# Patient Record
Sex: FEMALE | Race: WHITE | Employment: FULL TIME | URBAN - METROPOLITAN AREA
[De-identification: names, ages, dates, MRNs, and addresses within clinical notes are randomized per-mention and may not be internally consistent; named-entity substitution may affect disease eponyms.]

---

## 2017-03-07 RX ORDER — BUPROPION HYDROCHLORIDE 150 MG/1
TABLET, EXTENDED RELEASE ORAL
Qty: 90 TAB | Refills: 1 | Status: SHIPPED | OUTPATIENT
Start: 2017-03-07 | End: 2017-08-20 | Stop reason: SDUPTHER

## 2017-05-31 ENCOUNTER — OFFICE VISIT (OUTPATIENT)
Dept: FAMILY MEDICINE CLINIC | Age: 57
End: 2017-05-31

## 2017-05-31 VITALS
HEIGHT: 67 IN | TEMPERATURE: 98.5 F | RESPIRATION RATE: 12 BRPM | WEIGHT: 142 LBS | DIASTOLIC BLOOD PRESSURE: 80 MMHG | SYSTOLIC BLOOD PRESSURE: 110 MMHG | OXYGEN SATURATION: 97 % | HEART RATE: 74 BPM | BODY MASS INDEX: 22.29 KG/M2

## 2017-05-31 DIAGNOSIS — M67.442 GANGLION CYST OF FINGER OF LEFT HAND: Primary | ICD-10-CM

## 2017-05-31 NOTE — PROGRESS NOTES
HISTORY OF PRESENT ILLNESS  Lew Blancas is a 60 y.o. female. Pt presents with a cyst on her left thumb that is painful. That is her dominant hand. She has one on her right thumb as well but that one does not bother her. Cyst   The history is provided by the patient. This is a chronic problem. The current episode started more than 1 week ago. The problem has been gradually worsening. She has tried nothing for the symptoms. Review of Systems   Constitutional: Negative. Skin:        Cyst on left thumb       Physical Exam   Constitutional: She appears well-developed and well-nourished. Skin: Skin is warm. left thumb palmar surface there is a mobile tender cystic structure about the size of a large pea. There is no surrounding erythema. ASSESSMENT and PLAN    ICD-10-CM ICD-9-CM    1. Ganglion cyst of finger of left hand M67.442 727.43 REFERRAL TO GENERAL SURGERY     Plan: 1% lido with out epi was injected with out difficulty but no exudate was expelled. Site was cleaned and dressed. Signs of infection were reviewed. Pt is to call with any concerns.    Pt ref to General Surgery for further eval.

## 2017-05-31 NOTE — PROGRESS NOTES
1. Have you been to the ER, urgent care clinic since your last visit? Hospitalized since your last visit? Yes When: IN/OUT 01/2017    2. Have you seen or consulted any other health care providers outside of the [address] [place] [address] since your last visit? Include any pap smears or colon screening.  No

## 2017-05-31 NOTE — MR AVS SNAPSHOT
Visit Information Date & Time Provider Department Dept. Phone Encounter #  
 5/31/2017  3:00 PM Josie Rubio  [place] [address] [phone] Follow-up Instructions Return if symptoms worsen or fail to improve. Upcoming Health Maintenance Date Due DTaP/Tdap/Td series (2 - Td) 6/25/2017 INFLUENZA AGE 9 TO ADULT 8/1/2017 BREAST CANCER SCRN MAMMOGRAM 1/8/2018 PAP AKA CERVICAL CYTOLOGY 10/6/2018 COLONOSCOPY 5/13/2025 Allergies as of 5/31/2017  Review Complete On: 5/31/2017 By: Josie Rubio NP Severity Noted Reaction Type Reactions Bactrim [Sulfamethoxazole-trimethoprim]  06/29/2010    Rash Effexor [Venlafaxine]  06/29/2010   Side Effect Other (comments) Effect mitral valve prolapse Paxil [Paroxetine Hcl]  06/29/2010   Side Effect Other (comments)  
  medication would effect patients  mitral valve prolapse Penicillins  09/09/2013    Itching Sulfa (Sulfonamide Antibiotics)  06/29/2010    Rash  
 Zoloft [Sertraline]  06/29/2010   Side Effect Other (comments) Effect mitral valve prolapse Current Immunizations  Reviewed on 11/5/2013 Name Date Influenza Vaccine 10/7/2013 Influenza Vaccine (Quad) PF 11/10/2015, 10/7/2014 Pneumococcal Conjugate (PCV-13) 11/5/2013 Tdap 6/25/2007 Not reviewed this visit You Were Diagnosed With   
  
 Codes Comments Ganglion cyst of finger of left hand    -  Primary ICD-10-CM: P31.455 ICD-9-CM: 727.43 Vitals BP Pulse Temp Resp Height(growth percentile) Weight(growth percentile) 110/80 (BP 1 Location: Left arm, BP Patient Position: Sitting) 74 98.5 °F (36.9 °C) (Oral) 12 5' 7\" (1.702 m) 142 lb (64.4 kg) LMP SpO2 BMI OB Status Smoking Status 06/01/1992 97% 22.24 kg/m2 Hysterectomy Never Smoker BMI and BSA Data Body Mass Index Body Surface Area  
 22.24 kg/m 2 1.74 m 2 Preferred Pharmacy Pharmacy Name Phone [facility] [address] [phone] Your Updated Medication List  
  
   
This list is accurate as of: 5/31/17  5:32 PM.  Always use your most recent med list. amLODIPine 5 mg tablet Commonly known as:  Bonifacioul Marquita TAKE 1 TABLET DAILY  
  
 aspirin delayed-release 81 mg tablet Take 81 mg by mouth daily. budesonide-formoterol 80-4.5 mcg/actuation Hfaa inhaler Commonly known as:  SYMBICORT Take 2 Puffs by inhalation two (2) times a day. buPROPion  mg SR tablet Commonly known as:  WELLBUTRIN SR  
TAKE 1 TABLET DAILY CENTRUM SILVER ULTRA WOMEN'S PO Take  by mouth. fluticasone 50 mcg/actuation nasal spray Commonly known as:  Marcella Ronnie 2 Sprays by Both Nostrils route daily as needed for Rhinitis. Administer to right and left nostril.  
  
 lansoprazole 30 mg capsule Commonly known as:  PREVACID Take 30 mg by mouth daily (before breakfast). PLAQUENIL 200 mg tablet Generic drug:  hydroxychloroquine Take 200 mg by mouth two (2) times a day. PROAIR HFA 90 mcg/actuation inhaler Generic drug:  albuterol INHALE 2 PUFFS EVERY 4 HOURS AS NEEDED FOR WHEEZING We Performed the Following REFERRAL TO GENERAL SURGERY [REF27 Custom] Comments:  
 Please evaluate patient for cyst on left themb. Follow-up Instructions Return if symptoms worsen or fail to improve. Referral Information Referral ID Referred By Referred To  
  
 [id number] [facility] Not Available Visits Status Start Date End Date 1 New Request 5/31/17 5/31/18 If your referral has a status of pending review or denied, additional information will be sent to support the outcome of this decision. Introducing [facility] & HEALTH SERVICES! Dear Reuben Carrion: Thank you for requesting a [employer] account. Our records indicate that you already have an active [employer] account.   You can access your account anytime at [email or web address] Did you know that you can access your hospital and ER discharge instructions at any time in Nanostellar? You can also review all of your test results from your hospital stay or ER visit. Additional Information If you have questions, please visit the Frequently Asked Questions section of the [employer] website at [email or web address] Remember, Nanostellar is NOT to be used for urgent needs. For medical emergencies, dial 911. Now available from your iPhone and Android! Please provide this summary of care documentation to your next provider. Your primary care clinician is listed as [address]. If you have any questions after today's visit, please call [phone].

## 2017-06-21 ENCOUNTER — OFFICE VISIT (OUTPATIENT)
Dept: ORTHOPEDIC SURGERY | Age: 57
End: 2017-06-21

## 2017-06-21 VITALS
HEART RATE: 74 BPM | WEIGHT: 144 LBS | SYSTOLIC BLOOD PRESSURE: 99 MMHG | DIASTOLIC BLOOD PRESSURE: 62 MMHG | BODY MASS INDEX: 22.55 KG/M2 | TEMPERATURE: 98 F

## 2017-06-21 DIAGNOSIS — G89.29 CHRONIC RIGHT SHOULDER PAIN: ICD-10-CM

## 2017-06-21 DIAGNOSIS — M25.511 CHRONIC RIGHT SHOULDER PAIN: ICD-10-CM

## 2017-06-21 DIAGNOSIS — M79.645 THUMB PAIN, LEFT: ICD-10-CM

## 2017-06-21 DIAGNOSIS — M79.89 SOFT TISSUE MASS: Primary | ICD-10-CM

## 2017-06-21 NOTE — PROGRESS NOTES
Felicia Hdez  1960   Chief Complaint   Patient presents with    Hand Pain     Left    Shoulder Pain     Right        HISTORY OF PRESENT ILLNESS  Felicia Hdez is a 60 y.o. female who presents today for evaluation of left thumb and right shoulder pain. She rates her pain 0/10 today. Patient was referred by Ilda Gibson NP for further evaluation. She is complaining of a cyst in the left thumb and feels there may be another in the right shoulder. She states her PCP tried to removed the cyst on her left thumb. She states the cyst intermittently swells. The cyst on her left thumb has been present for about 6 months. She is LH dominant. She describes the pain in the right shoulder as dull and aching. She states she has been working out at [employer] and was attributing her shoulder pain to this. Allergies   Allergen Reactions    Bactrim [Sulfamethoxazole-Trimethoprim] Rash    Effexor [Venlafaxine] Other (comments)     Effect mitral valve prolapse    Paxil [Paroxetine Hcl] Other (comments)      medication would effect patients  mitral valve prolapse    Penicillins Itching    Sulfa (Sulfonamide Antibiotics) Rash    Zoloft [Sertraline] Other (comments)     Effect mitral valve prolapse        Past Medical History:   Diagnosis Date    Abnormal pap     ASCUS +HPV    Asthma 6/6/2012    Cancer (HCC)     skin, basal cell    Contact dermatitis and other eczema, due to unspecified cause     basal cell cancer    ULISES (generalised anxiety disorder)     GERD (gastroesophageal reflux disease) 6/29/2010    Hypercholesterolemia     Step I diet    MVP (mitral valve prolapse)     Pneumonia     4 times    Sciatica 6/6/2012      Social History     Social History    Marital status:      Spouse name: N/A    Number of children: N/A    Years of education: N/A     Occupational History    Not on file.      Social History Main Topics    Smoking status: Never Smoker    Smokeless tobacco: Never Used  Alcohol use 0.0 oz/week     0 Standard drinks or equivalent per week      Comment: occasional    Drug use: No    Sexual activity: Yes     Partners: Male     Other Topics Concern    Not on file     Social History Narrative      Past Surgical History:   Procedure Laterality Date    HX COLONOSCOPY  5/13/2015    follow up 5 years    HX GYN      hysterectomy      Family History   Problem Relation Age of Onset    Diabetes Mother     Heart Disease Mother     Cancer Father     Cancer Paternal Grandfather       Current Outpatient Prescriptions   Medication Sig    buPROPion SR (WELLBUTRIN SR) 150 mg SR tablet TAKE 1 TABLET DAILY    amLODIPine (NORVASC) 5 mg tablet TAKE 1 TABLET DAILY    MULTIVITS W-FE,OTHER MIN/LUT (CENTRUM SILVER ULTRA WOMEN'S PO) Take  by mouth.  fluticasone (FLONASE) 50 mcg/actuation nasal spray 2 Sprays by Both Nostrils route daily as needed for Rhinitis. Administer to right and left nostril.  PROAIR HFA 90 mcg/actuation inhaler INHALE 2 PUFFS EVERY 4 HOURS AS NEEDED FOR WHEEZING    lansoprazole (PREVACID) 30 mg capsule Take 30 mg by mouth daily (before breakfast).  aspirin delayed-release 81 mg tablet Take 81 mg by mouth daily.  budesonide-formoterol (SYMBICORT) 80-4.5 mcg/actuation HFAA inhaler Take 2 Puffs by inhalation two (2) times a day. (Patient taking differently: Take 2 Puffs by inhalation two (2) times a day. Patient is taking 1 puff bid)    hydroxychloroquine (PLAQUENIL) 200 mg tablet Take 200 mg by mouth two (2) times a day. No current facility-administered medications for this visit. REVIEW OF SYSTEM   Patient denies: Weight loss, Fever/Chills, HA, Visual changes, Fatigue, Chest pain, SOB, Abdominal pain, N/V/D/C, Blood in stool or urine, Edema. Pertinent positive as above in HPI.  All others were negative    PHYSICAL EXAM:   Visit Vitals    BP 99/62 (BP 1 Location: Left arm, BP Patient Position: Sitting)    Pulse 74    Temp 98 °F (36.7 °C) (Oral)  Wt 144 lb (65.3 kg)    LMP 06/01/1992    BMI 22.55 kg/m2     The patient is a well-developed, well-nourished female   in no acute distress. The patient is alert and oriented times three. The patient is alert and oriented times three. Mood and affect are normal.  LYMPHATIC: lymph nodes are not enlarged and are within normal limits  SKIN: normal in color and non tender to palpation. There are no bruises or abrasions noted. NEUROLOGICAL: Motor sensory exam is within normal limits. Reflexes are equal bilaterally. There is normal sensation to pinprick and light touch  MUSCULOSKELETAL:  Examination Left Hand   Skin Intact   Deformity -   Swelling -   Tenderness -   Tenderness A1 Pulley -   Finger flexion Full   Finger extension Full   Thenar Eminence Atrophy -   Sensation Normal   Capillary refill -   Heberden's nodes -   Dupuytren's -     Examination Left Wrist   Skin Intact   Tenderness -   Flexion 60   Extension 60   Deformity -   Effusion -   Tinnel's sign -   Phalen's test -   Finklestein maneuver -   Pain with thumb abduction -   Solid mass volar aspect fo the thumb  Examination Right shoulder   Skin Intact   AC joint tenderness +   Biceps tenderness -   Forward flexion/Elevation    Active abduction    Glenohumeral abduction 90   External rotation ROM 90   Internal rotation ROM 70   Apprehension -   Williamss Relocation -   Jerk -   Load and Shift -   Obriens -   Speeds -   Impingement sign +   Supraspinatus/Empty Can -, 5/5   External Rotation Strength -, 5/5   Lift Off/Belly Press -, 5/5   Neurovascular Intact   Prominence over the distal clavicle       PROCEDURE:     IMAGING:   XR of the left hand dated 6/21/17 was reviewed and read: Soft tissue calcification of the distal thumb on the volar side    XR of the right shoulder dated 6/21/17 was reviewed and read: Prominence of the distal clavical      IMPRESSION:      ICD-10-CM ICD-9-CM    1. Soft tissue mass M79.9 729.90    2.  Thumb pain, left M79.645 729.5 AMB POC XRAY, FINGER(S), 2+ VIEWS   3. Chronic right shoulder pain M25.511 719.41 AMB POC XRAY, SHOULDER; COMPLETE, 2+    G89.29 338.29         PLAN: I discussed with the patient that the cyst will be a nuisance more than anything. There is no necessity for surgical intervention of the cyst on the left thumb. She would like to think about proceeding with removal of the cyst.  Office note will be sent to the referring provider. She was given Florinda's card. She does not feel the shoulder is painful enough to proceed with an injection at this time. I will see her back as needed. Follow-up Disposition: Not on File    Scribed by Colby Roberts [facility]) as dictated by Bushra Osman MD    I, Dr. Bushra Osman, confirm that all documentation is accurate.     Bushra Osman M.D.   [facility]

## 2017-06-21 NOTE — PATIENT INSTRUCTIONS
Hand Pain: Care Instructions  Your Care Instructions  Common causes of hand pain are overuse and injuries, such as might happen during sports or home repair projects. Everyday wear and tear, especially as you get older, also can cause hand pain. Most minor hand injuries will heal on their own, and home treatment is usually all you need to do. If you have sudden and severe pain, you may need tests and treatment. Follow-up care is a key part of your treatment and safety. Be sure to make and go to all appointments, and call your doctor if you are having problems. Its also a good idea to know your test results and keep a list of the medicines you take. How can you care for yourself at home? · Take pain medicines exactly as directed. ¨ If the doctor gave you a prescription medicine for pain, take it as prescribed. ¨ If you are not taking a prescription pain medicine, ask your doctor if you can take an over-the-counter medicine. · Rest and protect your hand. Take a break from any activity that may cause pain. · Put ice or a cold pack on your hand for 10 to 20 minutes at a time. Put a thin cloth between the ice and your skin. · Prop up the sore hand on a pillow when you ice it or anytime you sit or lie down during the next 3 days. Try to keep it above the level of your heart. This will help reduce swelling. · If your doctor recommends a sling, splint, or elastic bandage to support your hand, wear it as directed. When should you call for help? Call 911 anytime you think you may need emergency care. For example, call if:  · Your hand turns cool or pale or changes color. Call your doctor now or seek immediate medical care if:  · You cannot move your hand. · Your hand pops, moves out of its normal position, and then returns to its normal position. · You have signs of infection, such as:  ¨ Increased pain, swelling, warmth, or redness. ¨ Red streaks leading from the sore area.   ¨ Pus draining from a place on your hand. ¨ A fever. · Your hand feels numb or tingly. Watch closely for changes in your health, and be sure to contact your doctor if:  · Your hand feels unstable when you try to use it. · You do not get better as expected. · You have any new symptoms, such as swelling. · Bruises from an injury to your hand last longer than 2 weeks. Where can you learn more? Go to http://kenneth-ramon.info/. Enter R273 in the search box to learn more about \"Hand Pain: Care Instructions. \"  Current as of: March 20, 2017  Content Version: 11.3  © 0742-2942 [employer]. Care instructions adapted under license by "[employer]" (which disclaims liability or warranty for this information). If you have questions about a medical condition or this instruction, always ask your healthcare professional. [address] any warranty or liability for your use of this information.

## 2017-08-21 RX ORDER — BUPROPION HYDROCHLORIDE 150 MG/1
TABLET, EXTENDED RELEASE ORAL
Qty: 90 TAB | Refills: 1 | Status: SHIPPED | OUTPATIENT
Start: 2017-08-21 | End: 2018-02-19 | Stop reason: SDUPTHER

## 2017-11-01 ENCOUNTER — OFFICE VISIT (OUTPATIENT)
Dept: PULMONOLOGY | Age: 57
End: 2017-11-01

## 2017-11-01 VITALS
OXYGEN SATURATION: 98 % | DIASTOLIC BLOOD PRESSURE: 60 MMHG | TEMPERATURE: 98.8 F | WEIGHT: 147 LBS | SYSTOLIC BLOOD PRESSURE: 112 MMHG | HEIGHT: 67 IN | HEART RATE: 78 BPM | BODY MASS INDEX: 23.07 KG/M2 | RESPIRATION RATE: 12 BRPM

## 2017-11-01 DIAGNOSIS — M34.1 CREST SYNDROME (HCC): ICD-10-CM

## 2017-11-01 DIAGNOSIS — J47.9 BRONCHIECTASIS WITHOUT COMPLICATION (HCC): ICD-10-CM

## 2017-11-01 DIAGNOSIS — J45.30 MILD PERSISTENT ASTHMA WITHOUT COMPLICATION: Primary | ICD-10-CM

## 2017-11-01 RX ORDER — FLUTICASONE FUROATE AND VILANTEROL 100; 25 UG/1; UG/1
1 POWDER RESPIRATORY (INHALATION) DAILY
Qty: 1 INHALER | Refills: 5 | Status: SHIPPED | OUTPATIENT
Start: 2017-11-01 | End: 2018-03-23

## 2017-11-01 RX ORDER — LORATADINE 10 MG/1
10 TABLET ORAL AS NEEDED
COMMUNITY

## 2017-11-01 RX ORDER — FLUTICASONE FUROATE AND VILANTEROL 100; 25 UG/1; UG/1
1 POWDER RESPIRATORY (INHALATION) DAILY
Qty: 1 INHALER | Refills: 0 | Status: SHIPPED | COMMUNITY
Start: 2017-11-01 | End: 2017-11-14 | Stop reason: SDUPTHER

## 2017-11-01 NOTE — MR AVS SNAPSHOT
Visit Information Date & Time Provider Department Dept. Phone Encounter #  
 11/1/2017  3:00 PM MD Rita [facility] [id number] Follow-up Instructions Return in about 3 months (around 2/1/2018). Your Appointments 11/14/2017  8:00 AM  
Office Visit with Carmelo Feng NP [facility] (ELLIE Seals) Appt Note: FU AND MED REFILLS; PT R/S FROM 10/23/2017; Copay: OV $20 / SP $40.; pt r/s 10/31/17 rb  
 [address], [place] [address] [address]  
[phone]  
  
   
 [address] Upcoming Health Maintenance Date Due DTaP/Tdap/Td series (2 - Td) 6/25/2017 INFLUENZA AGE 9 TO ADULT 8/1/2017 BREAST CANCER SCRN MAMMOGRAM 1/8/2018 PAP AKA CERVICAL CYTOLOGY 10/6/2018 COLONOSCOPY 5/13/2025 Allergies as of 11/1/2017  Review Complete On: 11/1/2017 By: Nishant Gar MD  
  
 Severity Noted Reaction Type Reactions Bactrim [Sulfamethoxazole-trimethoprim]  06/29/2010    Rash Effexor [Venlafaxine]  06/29/2010   Side Effect Other (comments) Effect mitral valve prolapse Paxil [Paroxetine Hcl]  06/29/2010   Side Effect Other (comments)  
  medication would effect patients  mitral valve prolapse Penicillins  09/09/2013    Itching Sulfa (Sulfonamide Antibiotics)  06/29/2010    Rash  
 Zoloft [Sertraline]  06/29/2010   Side Effect Other (comments) Effect mitral valve prolapse Current Immunizations  Reviewed on 11/5/2013 Name Date Influenza Vaccine 10/7/2013 Influenza Vaccine (Quad) PF 11/10/2015, 10/7/2014 Pneumococcal Conjugate (PCV-13) 11/5/2013 Tdap 6/25/2007 Not reviewed this visit You Were Diagnosed With   
  
 Codes Comments Mild persistent asthma without complication    -  Primary ICD-10-CM: J45.30 ICD-9-CM: 493.90 [facility]     VANNESA-19-DZ: J47.9 ICD-9-CM: 494.0   
 CREST syndrome ([facility]     ICD-10-CM: M34.1 ICD-9-CM: 710.1 Vitals BP Pulse Temp Resp Height(growth percentile) Weight(growth percentile) 112/60 (BP 1 Location: Left arm, BP Patient Position: Sitting) 78 98.8 °F (37.1 °C) 12 5' 7\" (1.702 m) 147 lb (66.7 kg) LMP SpO2 BMI OB Status Smoking Status 06/01/1992 98% 23.02 kg/m2 Hysterectomy Never Smoker Vitals History BMI and BSA Data Body Mass Index Body Surface Area 23.02 kg/m 2 1.78 m 2 Preferred Pharmacy Pharmacy Name Phone [id number]- [address] [phone] Your Updated Medication List  
  
   
This list is accurate as of: 11/1/17  3:55 PM.  Always use your most recent med list. amLODIPine 5 mg tablet Commonly known as:  Sony Alstrom TAKE 1 TABLET DAILY  
  
 aspirin delayed-release 81 mg tablet Take 81 mg by mouth daily. buPROPion  mg SR tablet Commonly known as:  WELLBUTRIN SR  
TAKE 1 TABLET DAILY CENTRUM SILVER ULTRA WOMEN'S PO Take  by mouth. fluticasone 50 mcg/actuation nasal spray Commonly known as:  Lindalee Munday 2 Sprays by Both Nostrils route daily as needed for Rhinitis. Administer to right and left nostril. * fluticasone-vilanterol 100-25 mcg/dose inhaler Commonly known as:  BREO ELLIPTA Take 1 Puff by inhalation daily. * fluticasone-vilanterol 100-25 mcg/dose inhaler Commonly known as:  BREO ELLIPTA Take 1 Puff by inhalation daily. lansoprazole 30 mg capsule Commonly known as:  PREVACID Take 30 mg by mouth daily (before breakfast). loratadine 10 mg tablet Commonly known as:  Payam [place] Take 10 mg by mouth as needed. PLAQUENIL 200 mg tablet Generic drug:  hydroxychloroquine Take 200 mg by mouth two (2) times a day. PROAIR HFA 90 mcg/actuation inhaler Generic drug:  albuterol INHALE 2 PUFFS EVERY 4 HOURS AS NEEDED FOR WHEEZING  
  
 * Notice: This list has 2 medication(s) that are the same as other medications prescribed for you. Read the directions carefully, and ask your doctor or other care provider to review them with you. Prescriptions Sent to Pharmacy Refills  
 fluticasone-vilanterol (BREO ELLIPTA) 100-25 mcg/dose inhaler 5 Sig: Take 1 Puff by inhalation daily. Class: Normal  
 Pharmacy: [address] [facility], [address] [facility] #: [phone] Route: Inhalation Follow-up Instructions Return in about 3 months (around 2/1/2018). Introducing [facility]! Dear Nuvia Munguia: Thank you for requesting a GlobalLog[employer] account. Our records indicate that you already have an active GlobalLog[employer] account. You can access your account anytime at [email or web address] Did you know that you can access your hospital and ER discharge instructions at any time in GlobalLogic? You can also review all of your test results from your hospital stay or ER visit. Additional Information If you have questions, please visit the Frequently Asked Questions section of the GlobalLogic website at [email or web address] Remember, GlobalLogic is NOT to be used for urgent needs. For medical emergencies, dial 911. Now available from your iPhone and Android! Please provide this summary of care documentation to your next provider. Your primary care clinician is listed as [address]. If you have any questions after today's visit, please call [phone].

## 2017-11-01 NOTE — PROGRESS NOTES
HISTORY OF PRESENT ILLNESS  Yoana Barrientos is a 59 y.o. female. HPI Comments: Evaluated  for abnormal CT chest that demonstrated isolated bronchiectasis in right upper lobe area. Patient carries history of CREST syndrome for past five years. Before that she carried diagnosis of undifferentiated connective tissue disorder for many years. She also carries diagnosis of chronic bronchitis for past five years. She has been closely followed by Rheumatologist and undergoes cardiac and pulmonary evaluation at regular interval. PFT performed in 2014 demonstrated mild obstructive and restrictive changes with baseline FEV1 around 73% of predicted. CT chest with contrast performed in December 2014 revealed mild bronchiectasis only involving right upper lobe area. She has a chronic cough with baseline yellowish phlegm production. From pulmonary prospective patient reports episodic SOB that usually occurs in extreme weather and or exposure to noxious stimulus such as cigarette smoke. She also reports subjective wheezing. She uses rescue inhaler with rapid improvement in breathing. She has noticed significant increase in pulmonary symptoms after acute bronchitis symptoms. She reports a history pneumonia in 1989. She denies any hospitalization related to respiratory process. As for her asthma, pt was well controlled on Symbicort for years, so she self discontinued this. She resumed use after note of increasing chest tightness and wheezing. This time around she did not perceive any response and has used rescue Albuterol more often. She denies any GERD symptoms.         Review of Systems   Constitutional: Negative for chills, diaphoresis, fever, malaise/fatigue and weight loss. HENT: Negative for congestion, ear discharge, ear pain, hearing loss, nosebleeds, sinus pain, sore throat and tinnitus. Eyes: Negative for blurred vision, double vision, photophobia, pain, discharge and redness.    Respiratory: Positive for cough, sputum production, shortness of breath and wheezing. Negative for hemoptysis and stridor. Cardiovascular: Negative for chest pain, palpitations, orthopnea, claudication, leg swelling and PND. Gastrointestinal: Negative for abdominal pain, blood in stool, constipation, diarrhea, heartburn, melena, nausea and vomiting. Genitourinary: Negative for dysuria, flank pain, frequency, hematuria and urgency. Musculoskeletal: Negative for back pain, falls, joint pain, myalgias and neck pain. Skin: Negative for itching and rash. Neurological: Negative for dizziness, tingling, tremors, sensory change, speech change, focal weakness, seizures, loss of consciousness, weakness and headaches. Endo/Heme/Allergies: Negative for environmental allergies. Does not bruise/bleed easily. Psychiatric/Behavioral: Negative for depression, hallucinations, memory loss, substance abuse and suicidal ideas. The patient is nervous/anxious. Past Medical History:   Diagnosis Date    Abnormal pap     ASCUS +HPV    Asthma 6/6/2012    Cancer ([facility] [address]     skin, basal cell    Contact dermatitis and other eczema, due to unspecified cause     basal cell cancer    ULISES (generalised anxiety disorder)     GERD (gastroesophageal reflux disease) 6/29/2010    Hypercholesterolemia     Step I diet    MVP (mitral valve prolapse)     Pneumonia     4 times    Sciatica 6/6/2012     Past Surgical History:   Procedure Laterality Date    HX COLONOSCOPY  5/13/2015    follow up 5 years    HX GYN      hysterectomy     Current Outpatient Prescriptions on File Prior to Visit   Medication Sig Dispense Refill    buPROPion SR (WELLBUTRIN SR) 150 mg SR tablet TAKE 1 TABLET DAILY 90 Tab 1    amLODIPine (NORVASC) 5 mg tablet TAKE 1 TABLET DAILY 90 Tab 3    MULTIVITS W-FE,OTHER MIN/LUT (CENTRUM SILVER ULTRA WOMEN'S PO) Take  by mouth.       fluticasone (FLONASE) 50 mcg/actuation nasal spray 2 Sprays by Both Nostrils route daily as needed for Rhinitis. Administer to right and left nostril. 3 Bottle 3    PROAIR HFA 90 mcg/actuation inhaler INHALE 2 PUFFS EVERY 4 HOURS AS NEEDED FOR WHEEZING 1 Inhaler 0    lansoprazole (PREVACID) 30 mg capsule Take 30 mg by mouth daily (before breakfast).  hydroxychloroquine (PLAQUENIL) 200 mg tablet Take 200 mg by mouth two (2) times a day.  aspirin delayed-release 81 mg tablet Take 81 mg by mouth daily. No current facility-administered medications on file prior to visit. Allergies   Allergen Reactions    Bactrim [Sulfamethoxazole-Trimethoprim] Rash    Effexor [Venlafaxine] Other (comments)     Effect mitral valve prolapse    Paxil [Paroxetine Hcl] Other (comments)      medication would effect patients  mitral valve prolapse    Penicillins Itching    Sulfa (Sulfonamide Antibiotics) Rash    Zoloft [Sertraline] Other (comments)     Effect mitral valve prolapse     Social History     Social History    Marital status:      Spouse name: N/A    Number of children: N/A    Years of education: N/A     Occupational History    Not on file. Social History Main Topics    Smoking status: Never Smoker    Smokeless tobacco: Never Used    Alcohol use 0.0 oz/week     0 Standard drinks or equivalent per week      Comment: occasional    Drug use: No    Sexual activity: Yes     Partners: Male     Other Topics Concern    Not on file     Social History Narrative     Blood pressure 112/60, pulse 78, temperature 98.8 °F (37.1 °C), resp. rate 12, height 5' 7\" (1.702 m), weight 66.7 kg (147 lb), last menstrual period 06/01/1992, SpO2 98 %. Physical Exam   Constitutional: She is oriented to person, place, and time. She appears well-developed and well-nourished. No distress. HENT:   Head: Normocephalic and atraumatic. Nose: Nose normal.   Mouth/Throat: No oropharyngeal exudate. Eyes: Conjunctivae and EOM are normal. Pupils are equal, round, and reactive to light.  Right eye exhibits no discharge. Left eye exhibits no discharge. Neck: No JVD present. No tracheal deviation present. No thyromegaly present. Cardiovascular: Normal rate, regular rhythm, normal heart sounds and intact distal pulses. Exam reveals no gallop and no friction rub. No murmur heard. Pulmonary/Chest: Effort normal and breath sounds normal. No stridor. No respiratory distress. She has no wheezes. She has no rales. She exhibits no tenderness. Abdominal: Soft. She exhibits no mass. There is no tenderness. Musculoskeletal: She exhibits no edema, tenderness or deformity. Lymphadenopathy:     She has no cervical adenopathy. Neurological: She is alert and oriented to person, place, and time. Skin: Skin is warm and dry. No rash noted. She is not diaphoretic. No erythema. Psychiatric: She has a normal mood and affect. Her behavior is normal. Judgment and thought content normal.     Echo Cardiogram Hgxehply01/14/2016  [address]  Component Name Value Ref Range   EF Echo 55     Result Impression   :   NORMAL LEFT VENTRICULAR SYSTOLIC FUNCTION WITH AN EJECTION FRACTION OF 15%   MILD DIASTOLIC DYSFUNCTION. THICKENED ANTERIOR LEAFLET OF THE MITRAL VALVE WITHOUT PROLAPSE   MILD TO MODERATE REGURGITATION   NORMAL PULMONARY ARTERY PRESSURE   NORMAL RIGHT VENTRICULAR SYSTOLIC FUNCTION. COMPARED WITH PRIOR ECHO DATED 6/24/14, CHANGES INCLUDE AN INCREASE IN MITRAL REGURGITATION   FROM TRACE TO MILD/MODERATE   PREVIOUSLY E' WAS 8.6 CENTIMETERS PER SECOND, THUS DIASTOLIC FUNCTION APPEARS SIMILAR. ALL OTHER FINDINGS REMAIN SIMILAR.      Clinical Indications: Palpitations; Mitral valve prolapse; [facility]   ICD Codes: R00.2; I34.1; M34.1 Technical Quality: Adequate     MEASUREMENTS:   2D ECHO    RV Diameter                       2.5 YL                0.0-1.0   LV Diastolic Diameter Base LX     3.9 OL                5.0-1.0   LV Systolic Diameter Base BY      4.7 cm                2.3-4.0   LV Ejection Fraction 2D Teich     7.35   IVS Diastolic Thickness           0.93 cm               0.6-1.1 cm   LVPW Diastolic VEYBALIQY          6.49 cm               3.5-0.4 cm   LA Systolic Diameter LX           3.1 cm                2.1-3.7 cm   Aortic Root Diameter              3.5 cm                2.0-3.5 cm   LA Area 4C View                   16.8 cm²   LA Area 2C View                   16.4 cm²   LA Length 4C                      5.1 cm   LA Length 2C                      4.7 cm   LA Volume                         49.4 cm³     DOPPLER    Mitral E Point Velocity           75.6 cm/s   Mitral  A Point Velocity          82.2 cm/s   Mitral A Duration                 149 ms   Mitral E to A Ratio               0.92                  1.0 to 1.5   Mitral E to LV E' Lateral Ratio   9.3   MV Deceleration Time              277 ms                160-240 ms   Isovolumic Relaxation Time        88 ms                 70-90 ms   Pulm Vein Atrial Reversal Veloci  60.4 cm/s             <35 cm/s   Pulm Vein Atrial Duration         145 ms   MR Peak Velocity                  562 cm/s   MR Orifice PISA                   0.092 cm²   Mitral Regurgitant Volume         17.3 cm³   E Prime Velocity                  8.2 cm/s   RA Pressure (Entered Value)       3 mmHg   TR Peak Velocity                  2.1 m/s   TR Peak [id number]                  26 mmHg   RV Systolic TYOPWRCL              11 mmHg       FINDINGS:     Left Ventricle: The left ventricle was normal in size   Normal wall thickness. Left Ventricle The left ventricular systolic function is normal.   Function: Mild diastolic dysfunction. No segmental wall motion abnormalities. LVEF: 55 %       Left Atrium: The left atrium was normal in size. The LA volume index was 28mL/m2   Right Ventricle: The right ventricle was normal in size and function   Tricuspid Annular Plane Systolic Excursion (TAPSE) is 2.3cm. Tricuspid Annular Plane Systolic Velocity (TAPSV) is 14cm/s.    Right Atrium: The right atrium was normal in size. Mitral Valve: Elongated, slightly thickened anterior mitral valve leaflet without prolapse. Mild to moderate mitral regurgitation. Aortic Valve: The aortic valve leaflets were normal without regurgitation or stenosis. Tricuspid Valve: The tricuspid valve was structurally normal with mild regurgitation. The estimated pulmonary artery pressure was 20mmHg. Pulmonic Valve: The pulmonic valve was structurally normal.   Aorta: The aortic root diameter was at the upper limits of normal = 3.5cm   Proximal ascending aorta = 3.5cm   Pericardium: The pericardium was normal.   Masses / Shunts: No masses, shunts or thrombi seen. Kathi Hagen MD   (Electronically Signed)   Final Date: 2016 16:53   Result Narrative        ECHOCARDIOGRAPHIC REPORT     Exam Date: 2016 15:05 Gender: F Referring Physician: Shiv Matthew   Name: Johanna Rucker :  Sonographer: Analisa Vogt   CPI: [id number] BP: 122 / 62 Ht: 66 Wt: 143 BSA: 1.74     Type of Exam: ECHO CARDIOGRAM COMPLETE   Procedure: 2-D echocardiogram,Color flow analysis, Spectral Doppler analysis   ________     PFT's: combined obstruction and restriction, see attached. ASSESSMENT and PLAN  Encounter Diagnoses   Name Primary?  Mild persistent asthma without complication Yes    Bronchiectasis without complication ([address]     CREST syndrome ([address]      Pt reminded on SSx of Bronchiectasis exacerbation and instructed to call for possible antibiotic therapy. As pt response to Symbicort has waned, will try Breo, see orders. Pt instructed on inhaler technique and sample given. Continue rescue Albuterol as needed. Reviewed outside Echo and PFT's with pt. Possibility of pulmonary HTN associated with CTD less likely given Echo results but will keep in differential if SOB persists. In that case pt may require R heart catheterization. RTC 2 months.

## 2017-11-14 ENCOUNTER — HOSPITAL ENCOUNTER (OUTPATIENT)
Dept: LAB | Age: 57
Discharge: HOME | End: 2017-11-14
Payer: COMMERCIAL

## 2017-11-14 ENCOUNTER — OFFICE VISIT (OUTPATIENT)
Dept: FAMILY MEDICINE CLINIC | Age: 57
End: 2017-11-14

## 2017-11-14 VITALS
SYSTOLIC BLOOD PRESSURE: 115 MMHG | OXYGEN SATURATION: 98 % | HEIGHT: 67 IN | DIASTOLIC BLOOD PRESSURE: 66 MMHG | HEART RATE: 79 BPM | BODY MASS INDEX: 22.91 KG/M2 | RESPIRATION RATE: 18 BRPM | TEMPERATURE: 98.1 F | WEIGHT: 146 LBS

## 2017-11-14 DIAGNOSIS — J18.9 RECURRENT PNEUMONIA: ICD-10-CM

## 2017-11-14 DIAGNOSIS — I10 ESSENTIAL HYPERTENSION: ICD-10-CM

## 2017-11-14 DIAGNOSIS — E78.00 PURE HYPERCHOLESTEROLEMIA: ICD-10-CM

## 2017-11-14 DIAGNOSIS — Z23 ENCOUNTER FOR IMMUNIZATION: ICD-10-CM

## 2017-11-14 DIAGNOSIS — I10 ESSENTIAL HYPERTENSION: Primary | ICD-10-CM

## 2017-11-14 LAB
ALBUMIN SERPL-MCNC: 4 G/DL (ref 3.4–5)
ALBUMIN/GLOB SERPL: 1 {RATIO} (ref 0.8–1.7)
ALP SERPL-CCNC: 48 U/L (ref 45–117)
ALT SERPL-CCNC: 87 U/L (ref 13–56)
ANION GAP SERPL CALC-SCNC: 6 MMOL/L (ref 3–18)
AST SERPL-CCNC: 40 U/L (ref 15–37)
BILIRUB SERPL-MCNC: 0.5 MG/DL (ref 0.2–1)
BUN SERPL-MCNC: 17 MG/DL (ref 7–18)
BUN/CREAT SERPL: 23 (ref 12–20)
CALCIUM SERPL-MCNC: 9.4 MG/DL (ref 8.5–10.1)
CHLORIDE SERPL-SCNC: 102 MMOL/L (ref 100–108)
CHOLEST SERPL-MCNC: 234 MG/DL
CO2 SERPL-SCNC: 33 MMOL/L (ref 21–32)
CREAT SERPL-MCNC: 0.73 MG/DL (ref 0.6–1.3)
ERYTHROCYTE [DISTWIDTH] IN BLOOD BY AUTOMATED COUNT: 13.1 % (ref 11.6–14.5)
GLOBULIN SER CALC-MCNC: 4 G/DL (ref 2–4)
GLUCOSE SERPL-MCNC: 86 MG/DL (ref 74–99)
HCT VFR BLD AUTO: 44.8 % (ref 35–45)
HDLC SERPL-MCNC: 64 MG/DL (ref 40–60)
HDLC SERPL: 3.7 {RATIO} (ref 0–5)
HGB BLD-MCNC: 14.4 G/DL (ref 12–16)
LDLC SERPL CALC-MCNC: 145.8 MG/DL (ref 0–100)
LIPID PROFILE,FLP: ABNORMAL
MCH RBC QN AUTO: 31.3 PG (ref 24–34)
MCHC RBC AUTO-ENTMCNC: 32.1 G/DL (ref 31–37)
MCV RBC AUTO: 97.4 FL (ref 74–97)
PLATELET # BLD AUTO: 240 K/UL (ref 135–420)
PMV BLD AUTO: 12.2 FL (ref 9.2–11.8)
POTASSIUM SERPL-SCNC: 4.2 MMOL/L (ref 3.5–5.5)
PROT SERPL-MCNC: 8 G/DL (ref 6.4–8.2)
RBC # BLD AUTO: 4.6 M/UL (ref 4.2–5.3)
SODIUM SERPL-SCNC: 141 MMOL/L (ref 136–145)
TRIGL SERPL-MCNC: 121 MG/DL (ref ?–150)
VLDLC SERPL CALC-MCNC: 24.2 MG/DL
WBC # BLD AUTO: 4.1 K/UL (ref 4.6–13.2)

## 2017-11-14 PROCEDURE — 85027 COMPLETE CBC AUTOMATED: CPT | Performed by: NURSE PRACTITIONER

## 2017-11-14 PROCEDURE — 80053 COMPREHEN METABOLIC PANEL: CPT | Performed by: NURSE PRACTITIONER

## 2017-11-14 PROCEDURE — 36415 COLL VENOUS BLD VENIPUNCTURE: CPT | Performed by: NURSE PRACTITIONER

## 2017-11-14 PROCEDURE — 80061 LIPID PANEL: CPT | Performed by: NURSE PRACTITIONER

## 2017-11-14 NOTE — PROGRESS NOTES
1. Have you been to the ER, urgent care clinic since your last visit? Hospitalized since your last visit? January 2017 for pneumonia In/Out [facility]    2. Have you seen or consulted any other health care providers outside of the [address] [place] [address] since your last visit? Include any pap smears or colon screening. No     Subjective:   Pablito Malcolm is a 59 y.o. female with hypertension. Current Outpatient Prescriptions   Medication Sig Dispense Refill    loratadine (CLARITIN) 10 mg tablet Take 10 mg by mouth as needed.  fluticasone-vilanterol (BREO ELLIPTA) 100-25 mcg/dose inhaler Take 1 Puff by inhalation daily. 1 Inhaler 5    buPROPion SR (WELLBUTRIN SR) 150 mg SR tablet TAKE 1 TABLET DAILY 90 Tab 1    amLODIPine (NORVASC) 5 mg tablet TAKE 1 TABLET DAILY 90 Tab 3    MULTIVITS W-FE,OTHER MIN/LUT (CENTRUM SILVER ULTRA WOMEN'S PO) Take  by mouth.  fluticasone (FLONASE) 50 mcg/actuation nasal spray 2 Sprays by Both Nostrils route daily as needed for Rhinitis. Administer to right and left nostril. 3 Bottle 3    PROAIR HFA 90 mcg/actuation inhaler INHALE 2 PUFFS EVERY 4 HOURS AS NEEDED FOR WHEEZING 1 Inhaler 0    lansoprazole (PREVACID) 30 mg capsule Take 30 mg by mouth daily (before breakfast).  hydroxychloroquine (PLAQUENIL) 200 mg tablet Take 200 mg by mouth two (2) times a day.  aspirin delayed-release 81 mg tablet Take 81 mg by mouth daily. Hypertension ROS: taking medications as instructed, no medication side effects noted, no TIA's, no chest pain on exertion, no dyspnea on exertion, no swelling of ankles. New concerns: immunization update. Patient reports that she had pneumonia in January 2017 and it took five antibiotics to resolve. She also reports that she has seen Dr. Valery Rogers recently and her Symbicort was changes to [facility] and she seems to be doing better.     She recalls that her only symptoms for pneumonia was a fever for four days but no other symptoms and she has had the Prevnar 13 vaccine in November 2013 and did not think it was pneumonia. No records on chart and signed release of information for records today  Fasting today   No request for refills . Objective:   Awake and alert in no acute distress  Neck supple without lymphadenopathy, no carotid artery bruits auscultated bilaterally. No thyromegaly  Lungs clear throughout  S1 S2 RRR without ectopy or murmur auscultated. Extremities: no clubbing, cyanosis, peripheral edema      Visit Vitals    /66 (BP 1 Location: Left arm, BP Patient Position: Sitting)    Pulse 79    Temp 98.1 °F (36.7 °C) (Oral)    Resp 18    Ht 5' 7\" (1.702 m)    Wt 146 lb (66.2 kg)    LMP 06/01/1992    SpO2 98%    BMI 22.87 kg/m2      Diagnoses and all orders for this visit:    1. Essential hypertension  -     LIPID PANEL; Future  -     METABOLIC PANEL, COMPREHENSIVE; Future  -     CBC W/O DIFF; Future    2. Recurrent pneumonia    3. Encounter for immunization  -     Influenza virus vaccine (QUADRIVALENT PRES FREE SYRINGE) IM ([id number])  -     Pneumococcal polysaccharide vaccine, 23-valent, adult or immunosuppressed pt dose    4. Pure hypercholesterolemia  -     LIPID PANEL; Future      Health maintenance reviewed  Reviewed risks and benefits and common side effects of immunization reviewed. Patient verbalizes understanding. I have discussed the diagnosis with the patient and the intended plan as seen in the above orders. The patient has received an after-visit summary and questions were answered concerning future plans. I have discussed medication side effects and warnings with the patient as well. Follow-up Disposition:  Return in about 4 months (around 3/14/2018), or if symptoms worsen or fail to improve.

## 2017-11-14 NOTE — MR AVS SNAPSHOT
Visit Information Date & Time Provider Department Dept. Phone Encounter #  
 11/14/2017  8:00 AM Juan Jose Antonio NP [facility] Josh Mishratone [facility] [phone] [id number] Follow-up Instructions Return in about 4 months (around 3/14/2018), or if symptoms worsen or fail to improve. Upcoming Health Maintenance Date Due  
 BREAST CANCER SCRN MAMMOGRAM 1/8/2018 PAP AKA CERVICAL CYTOLOGY 10/6/2018 COLONOSCOPY 5/13/2025 DTaP/Tdap/Td series (3 - Td) 11/14/2027 Allergies as of 11/14/2017  Review Complete On: 11/1/2017 By: Denis Villalobos MD  
  
 Severity Noted Reaction Type Reactions Bactrim [Sulfamethoxazole-trimethoprim]  06/29/2010    Rash Effexor [Venlafaxine]  06/29/2010   Side Effect Other (comments) Effect mitral valve prolapse Paxil [Paroxetine Hcl]  06/29/2010   Side Effect Other (comments)  
  medication would effect patients  mitral valve prolapse Penicillins  09/09/2013    Itching Sulfa (Sulfonamide Antibiotics)  06/29/2010    Rash  
 Zoloft [Sertraline]  06/29/2010   Side Effect Other (comments) Effect mitral valve prolapse Current Immunizations  Reviewed on 11/5/2013 Name Date Influenza Vaccine 10/7/2013 Influenza Vaccine (Quad) PF  Incomplete, 11/10/2015, 10/7/2014 Pneumococcal Conjugate (PCV-13) 11/5/2013 Pneumococcal Polysaccharide (PPSV-23)  Incomplete Tdap 6/25/2007 Not reviewed this visit You Were Diagnosed With   
  
 Codes Comments Essential hypertension    -  Primary ICD-10-CM: I10 
ICD-9-CM: 401.9 Recurrent pneumonia     ICD-10-CM: J18.9 ICD-9-CM: 612 Encounter for immunization     ICD-10-CM: E91 ICD-9-CM: V03.89 Pure hypercholesterolemia     ICD-10-CM: E78.00 ICD-9-CM: 272.0 Vitals  BP Pulse Temp Resp Height(growth percentile) Weight(growth percentile)  
 115/66 (BP 1 Location: Left arm, BP Patient Position: Sitting) 79 98.1 °F (36.7 °C) (Oral) 18 5' 7\" (1.702 m) 146 lb (66.2 kg) LMP SpO2 BMI OB Status Smoking Status 06/01/1992 98% 22.87 kg/m2 Hysterectomy Never Smoker BMI and BSA Data Body Mass Index Body Surface Area  
 22.87 kg/m 2 1.77 m 2 Preferred Pharmacy Pharmacy Name Phone [id number] [phone], [address] [phone] Your Updated Medication List  
  
   
This list is accurate as of: 11/14/17  8:22 AM.  Always use your most recent med list. amLODIPine 5 mg tablet Commonly known as:  Sae Conine TAKE 1 TABLET DAILY  
  
 aspirin delayed-release 81 mg tablet Take 81 mg by mouth daily. buPROPion  mg SR tablet Commonly known as:  WELLBUTRIN SR  
TAKE 1 TABLET DAILY CENTRUM SILVER ULTRA WOMEN'S PO Take  by mouth. fluticasone 50 mcg/actuation nasal spray Commonly known as:  Max Brewer 2 Sprays by Both Nostrils route daily as needed for Rhinitis. Administer to right and left nostril. fluticasone-vilanterol 100-25 mcg/dose inhaler Commonly known as:  BREO ELLIPTA Take 1 Puff by inhalation daily. lansoprazole 30 mg capsule Commonly known as:  PREVACID Take 30 mg by mouth daily (before breakfast). loratadine 10 mg tablet Commonly known as:  Yeimi Corby Take 10 mg by mouth as needed. PLAQUENIL 200 mg tablet Generic drug:  hydroxychloroquine Take 200 mg by mouth two (2) times a day. PROAIR HFA 90 mcg/actuation inhaler Generic drug:  albuterol INHALE 2 PUFFS EVERY 4 HOURS AS NEEDED FOR WHEEZING We Performed the Following INFLUENZA VIRUS VAC QUAD,SPLIT,PRESV FREE SYRINGE IM [id number] CPT(R)] PNEUMOCOCCAL POLYSACCHARIDE VACCINE, 23-VALENT, ADULT OR IMMUNOSUPPRESSED PT DOSE, [45285 CPT(R)] Follow-up Instructions Return in about 4 months (around 3/14/2018), or if symptoms worsen or fail to improve. To-Do List   
 11/14/2017   Lab:  CBC W/O DIFF   
  
 11/14/2017 Lab:  LIPID PANEL   
  
 11/14/2017 Lab:  METABOLIC PANEL, COMPREHENSIVE Patient Instructions Influenza (Flu) Vaccine (Inactivated or Recombinant): What You Need to Know Why get vaccinated? Influenza (\"flu\") is a contagious disease that spreads around the [place] every winter, usually between October and May. Flu is caused by influenza viruses and is spread mainly by coughing, sneezing, and close contact. Anyone can get flu. Flu strikes suddenly and can last several days. Symptoms vary by age, but can include: · Fever/chills. · Sore throat. · Muscle aches. · Fatigue. · Cough. · Headache. · Runny or stuffy nose. Flu can also lead to pneumonia and blood infections, and cause diarrhea and seizures in children. If you have a medical condition, such as heart or lung disease, flu can make it worse. Flu is more dangerous for some people. Infants and young children, people 72years of age and older, pregnant women, and people with certain health conditions or a weakened immune system are at greatest risk. Each year thousands of people in the [facility] die from flu, and many more are hospitalized. Flu vaccine can: · Keep you from getting flu. · Make flu less severe if you do get it. · Keep you from spreading flu to your family and other people. Inactivated and recombinant flu vaccines A dose of flu vaccine is recommended every flu season. Children 6 months through 6years of age may need two doses during the same flu season. Everyone else needs only one dose each flu season. Some inactivated flu vaccines contain a very small amount of a mercury-based preservative called thimerosal. Studies have not shown thimerosal in vaccines to be harmful, but flu vaccines that do not contain thimerosal are available. There is no live flu virus in flu shots. They cannot cause the flu. There are many flu viruses, and they are always changing.  Each year a new flu vaccine is made to protect against three or four viruses that are likely to cause disease in the upcoming flu season. But even when the vaccine doesn't exactly match these viruses, it may still provide some protection. Flu vaccine cannot prevent: · Flu that is caused by a virus not covered by the vaccine. · Illnesses that look like flu but are not. Some people should not get this vaccine Tell the person who is giving you the vaccine: · If you have any severe (life-threatening) allergies. If you ever had a life-threatening allergic reaction after a dose of flu vaccine, or have a severe allergy to any part of this vaccine, you may be advised not to get vaccinated. Most, but not all, types of flu vaccine contain a small amount of egg protein. · If you ever had Guillain-Barré syndrome (also called GBS) Some people with a history of GBS should not get this vaccine. This should be discussed with your doctor. · If you are not feeling well. It is usually okay to get flu vaccine when you have a mild illness, but you might be asked to come back when you feel better. Risks of a vaccine reaction With any medicine, including vaccines, there is a chance of reactions. These are usually mild and go away on their own, but serious reactions are also possible. Most people who get a flu shot do not have any problems with it. Minor problems following a flu shot include: · Soreness, redness, or swelling where the shot was given · Hoarseness · Sore, red or itchy eyes · Cough · Fever · Aches · Headache · Itching · Fatigue If these problems occur, they usually begin soon after the shot and last 1 or 2 days. More serious problems following a flu shot can include the following: · There may be a small increased risk of Guillain-Barré Syndrome (GBS) after inactivated flu vaccine. This risk has been estimated at 1 or 2 additional cases per million people vaccinated.  This is much lower than System\" ([employer]). Your doctor should file this report, or you can do it yourself through the VAERS website at www.vaers. [facility].gov, or by calling 2-873.146.9084. [employer] does not give medical advice. The National Vaccine Injury Compensation Program 
The National Vaccine Injury Compensation Program (VICP) is a federal program that was created to compensate people who may have been injured by certain vaccines. Persons who believe they may have been injured by a vaccine can learn about the program and about filing a claim by calling 5-898.638.6859 or visiting the [employer] website at www.[facility].gov/vaccinecompensation. There is a time limit to file a claim for compensation. How can I learn more? · Ask your healthcare provider. He or she can give you the vaccine package insert or suggest other sources of information. · Call your local or state health department. · Contact the Centers for Disease Control and Prevention (CDC): 
¨ Call 5-263.690.9039 (1-800-CDC-INFO) or ¨ Visit CDC's website at www.cdc.gov/flu Vaccine Information Statement Inactivated Influenza Vaccine 8/7/2015) [address] SH[address]quis  937EM-88 Department of Health and [employer] Centers for Disease Control and Prevention Many Vaccine Information Statements are available in Bengali and other languages. See www.immunize.org/vis. Muchas hojas de información sobre vacunas están disponibles en español y en otros idiomas. Visite www.immunize.org/vis. Care instructions adapted under license by [employer] (which disclaims liability or warranty for this information). If you have questions about a medical condition or this instruction, always ask your healthcare professional. [place] [address] any warranty or liability for your use of this information. Pneumococcal Polysaccharide Vaccine: Care Instructions Your Care Instructions The pneumococcal polysaccharide vaccine (PPSV) can prevent some of the serious complications of pneumonia. This includes infection in the bloodstream (bacteremia) or throughout the body (septicemia). PPSV is recommended for people ages 72 years and older. People ages 3 to 59 who have a long-term illness should also get the vaccine. This includes people with diabetes, heart disease, liver disease, or lung disease. PPSV can also help people who have a weakened immune system. This includes cancer patients and people who don't have a spleen. The immune system helps your body fight infection and other illnesses. PPSV is given as a shot. It's usually given in the arm. Healthy older adults get the shot once. Other people may need to have a second dose. The shot may cause pain and redness at the site. It may also cause a mild fever for a short time. Follow-up care is a key part of your treatment and safety. Be sure to make and go to all appointments, and call your doctor if you are having problems. It's also a good idea to know your test results and keep a list of the medicines you take. How can you care for yourself at home? · Take an over-the-counter pain medicine, such as acetaminophen (Tylenol), ibuprofen (Advil, Motrin), or naproxen (Aleve), if your arm is sore after the shot. Be safe with medicines. Read and follow all instructions on the label. · Give acetaminophen (Tylenol) or ibuprofen (Advil, Motrin) to your child for pain or fussiness after the shot. Read and follow all instructions on the label. Do not give aspirin to anyone younger than 20. It has been linked to Reye syndrome, a serious illness. · Put ice or a cold pack on the sore area for 10 to 20 minutes at a time. Put a thin cloth between the ice and your skin. When should you call for help? Call 911 anytime you think you may need emergency care. For example, call if: 
? · You have a seizure. ? · You have symptoms of a severe allergic reaction. These may include: ¨ Sudden raised, red areas (hives) all over the body. ¨ Swelling of the throat, mouth, lips, or tongue. ¨ Trouble breathing. ¨ Passing out (losing consciousness). Or you may feel very lightheaded or suddenly feel weak, confused, or restless. ?Call your doctor now or seek immediate medical care if: 
? · You have symptoms of an allergic reaction, such as: ¨ A rash or hives (raised, red areas on the skin). ¨ Itching. ¨ Swelling. ¨ Belly pain, nausea, or vomiting. ? · You have a high fever. ? Watch closely for changes in your health, and be sure to contact your doctor if you have any problems. Where can you learn more? Go to http://kenneth-ramon.info/. Enter T225 in the search box to learn more about \"Pneumococcal Polysaccharide Vaccine: Care Instructions. \" Current as of: September 24, 2016 Content Version: 11.4 © 0907-1778 [employer]. Care instructions adapted under license by [employer] (which disclaims liability or warranty for this information). If you have questions about a medical condition or this instruction, always ask your healthcare professional. [place] [address] any warranty or liability for your use of this information. Alzheimer's Disease: Care Instructions Your Care Instructions Alzheimer's disease is a type of dementia. It causes memory loss and affects judgment, language, and behavior. You may have trouble making decisions or may get lost in places that you used to know well. Alzheimer's disease is different than mild memory loss that occurs with aging. It is not clear what causes Alzheimer's disease, but it is the most common form of dementia in older adults. Finding out that you have this disease is a shock. You may be afraid and worried about how the condition will change your life.  Although there is no cure at this time, medicine in some cases may slow memory loss for a while. Other medicines may be able to help you sleep or cope with depression and behavior changes. Alzheimer's disease is different for everyone. It may take many years to develop. In some cases, people can function well for a long time. In the early stage of the disease, you can do things at home to make life easier and safer. You also can keep doing your hobbies and other activities. Many people find comfort in planning now for their future needs. Follow-up care is a key part of your treatment and safety. Be sure to make and go to all appointments, and call your doctor if you are having problems. It's also a good idea to know your test results and keep a list of the medicines you take. How can you care for yourself at home? Taking care of yourself · If your doctor gives you medicines, take them exactly as prescribed. Call your doctor if you think you are having a problem with your medicine. You will get more details on the medicines your doctor prescribes. · Eat a balanced diet. Get plenty of whole grains, fruits, and vegetables every day. If you are not hungry at mealtimes, eat snacks at midmorning and in the afternoon. Try drinks such as Boost, Ensure, or Sustacal if you are having trouble keeping your weight up. · Stay active. Exercise such as walking may slow the decline of your mental abilities. Try to stay active mentally too. Read and work crossword puzzles if you enjoy these activities. · If you have trouble sleeping, do not nap during the day. Get regular exercise (but not within several hours of bedtime). Drink a glass of warm milk or caffeine-free herbal tea before going to bed. · Ask your doctor about support groups and other resources in your area. They can help people who have Alzheimer's disease and their families. · Be patient. You may find that a task takes you longer than it used to. · If you have not already done so, make a list of advance directives. Advance directives are instructions to your doctor and family members about what kind of care you want if you become unable to speak or express yourself. Talk to a  about making a will, if you do not already have one. Keeping schedules · Develop a routine. You will feel less frustrated or confused if you have a clear, simple plan of what to do every day. ¨ Make lists of your medicines and when to take them. ¨ Write down appointments and other tasks in a calendar. ¨ Put sticky notes around the house to help you remember events and other things you have to do. ¨ Schedule activities and tasks for times of the day when you are best able to handle them. Staying safe · Tell someone when you are going out and where you are going. Let the person know when you will be back. Before you go out alone, write down where you are going, how to get there, and how to get back home. Do this even if you have gone there many times before. Take someone along with you when possible. · Make your home safe. Tack down rugs, put no-slip tape in the tub, use handrails, and put safety switches on stoves and appliances. · Have a family member or other caregiver tell you whether you are driving badly. Deciding to stop driving is very hard for many people. Driving helps you feel independent. Your state 's license bureau can do a driving test if there is any question. Plan for other means of getting around when you are no longer able to drive. · Use strong lighting, especially at night. Put night-lights in bedrooms, hallways, and bathrooms. · Lower the hot water temperature setting to 120°F or lower to avoid burns. When should you call for help? Call 911 anytime you think you may need emergency care. For example, call if: 
? · You are lost and do not know whom to call. ? · You are injured and do not know whom to call. ?Call your doctor now or seek immediate medical care if: 
? · Your symptoms suddenly get much worse. ?Watch closely for changes in your health, and be sure to contact your doctor if: 
? · You want more information about how you can take care of yourself. Where can you learn more? Go to http://kenneth-ramon.info/. Enter Y179 in the search box to learn more about \"Alzheimer's Disease: Care Instructions. \" Current as of: May 12, 2017 Content Version: 11.4 © 9604-9977 [employer]. Care instructions adapted under license by [employer] (which disclaims liability or warranty for this information). If you have questions about a medical condition or this instruction, always ask your healthcare professional. [address] any warranty or liability for your use of this information. Helping a Person With Alzheimer's Disease: Care Instructions Your Care Instructions Alzheimer's disease is a type of dementia. It affects memory, intelligence, judgment, language, and behavior. It is not clear what causes this disease. But it is the most common form of dementia in older adults. It may take many years to develop. Alzheimer's disease is different than mild memory loss that occurs with aging. Family members usually notice symptoms first. But the person also may realize that something is wrong. Follow-up care is a key part of your loved one's treatment and safety. Be sure to make and go to all appointments, and call your doctor if your loved one is having problems. It's also a good idea to know your loved one's test results and keep a list of the medicines he or she takes. How can you care for your loved one at home? · Develop a routine. The person will feel less frustrated or confused with a clear, simple daily plan. Remind him or her about important facts and events. · Be patient. It may take longer for the person to complete a task than it used to. · Help the person eat a balanced diet.  Serve plenty of whole grains, fruits, and vegetables every day. If the person is not eating well at mealtimes, give snacks at midmorning and in the afternoon. Offer drinks such as Boost, Ensure, or Sustacal if he or she is losing weight. · Encourage exercise. Walking and other activity may slow the decline of mental ability. Help the person keep an active mind. Encourage hobbies such as reading and crossword puzzles. · Take steps to help if the person is sundowning. This is the restless behavior and trouble with sleeping that may occur in late afternoon and at night. Try not to let the person nap during the day. Offer a glass of warm milk or caffeine-free tea before bedtime. · Ask family members and friends for help. You may need breaks where others can help care for the person. · Talk to the person's doctor about what resources are available for help in your area. · Review all of the person's medicines with his or her doctor. · For as long as the person is able, allow him or her to make decisions about activities, food, clothing, and other choices. Let the person be independent, even if tasks take more time or are not done perfectly. Tailor tasks to the person's abilities. For example, if cooking is no longer safe, ask for other help. He or she can help set the table or make simple dishes such as a salad. When the person needs help, offer it gently. Keeping safe · Make your home (or the person's home) safe. Tack down rugs, and put no-slip tape in the tub. Install handrails, and put safety switches on stoves and appliances. Keep rooms free of clutter. Make sure walkways around furniture are clear. Do not move furniture around, because the person may become confused. · Use locks on doors and cupboards. Lock up knives, scissors, medicines, cleaning supplies, and other dangerous things. · Do not let the person drive or cook if he or she cannot do it safely.  A person with Alzheimer's should not drive unless he or she is able to pass an on-road driving test. Your state 's license bureau can do a driving test if there is any question. · Get medical alert jewelry for the person so you can be contacted if he or she wanders away. If possible, provide a safe place for wandering, such as an enclosed yard or garden. When should you call for help? Call 911 anytime you think you may need emergency care. For example, call if: 
? · A person who has Alzheimer's disease has disappeared. ? · A person who has Alzheimer's disease is seriously injured. ?Call your doctor now or seek immediate medical care if: 
? · The person you are caring for suddenly sees or hears things that are not there (hallucinates). ? · The person you are caring for has a sudden, drastic change in his or her behavior. ? Watch closely for changes in your loved one's health, and be sure to contact the doctor if: 
? · A person who has Alzheimer's disease gradually gets worse or has symptoms that could cause injury. ? · You need help caring for a person with Alzheimer's disease. ? · The person has problems with his or her medicine. Where can you learn more? Go to http://kenneth-ramon.info/. Enter Z156 in the search box to learn more about \"Helping a Person With Alzheimer's Disease: Care Instructions. \" Current as of: May 12, 2017 Content Version: 11.4 © 8216-2568 [employer]wise[employer]. Care instructions adapted under license by [employer] (which disclaims liability or warranty for this information). If you have questions about a medical condition or this instruction, always ask your healthcare professional. [place] [address] any warranty or liability for your use of this information. Dementia: Care Instructions Your Care Instructions Dementia is a loss of mental skills that affects your daily life. It is different than the occasional trouble with memory that is part of aging. You may find it hard to remember things that you feel you should be able to remember. Or you may feel that your mind is just not working as well as usual. 
Finding out that you have dementia is a shock. You may be afraid and worried about how the condition will change your life. Although there is no cure at this time, medicine may slow memory loss and improve thinking for a while. Other medicines may be able to help you sleep or cope with depression and behavior changes. Dementia often gets worse slowly. But it can get worse quickly. As dementia gets worse, it may become harder to do common things that take planning, like making a list and going shopping. Over time, the disease may make it hard for you to take care of yourself. Some people with dementia need others to help care for them. Dementia is different for everyone. You may be able to function well for a long time. In the early stage of the condition, you can do things at home to make life easier and safer. You also can keep doing your hobbies and other activities. Many people find comfort in planning now for their future needs. Follow-up care is a key part of your treatment and safety. Be sure to make and go to all appointments, and call your doctor if you are having problems. It's also a good idea to know your test results and keep a list of the medicines you take. How can you care for yourself at home? · Take your medicines exactly as prescribed. Call your doctor if you think you are having a problem with your medicine. · Eat healthy foods. Eat lots of whole grains, fruits, and vegetables every day. If you are not hungry, try snacks or nutritional drinks such as Boost, Ensure, or Sustacal. 
· If you have problems sleeping: ¨ Try not to nap too close to your bedtime. ¨ Exercise regularly. Walking is a good choice. ¨ Try a glass of warm milk or caffeine-free herbal tea before bed. · Do tasks and activities during the time of day when you feel your best. It may help to develop a daily routine. · Post labels, lists, and sticky notes to help you remember things. Write your activities on a calendar you can easily find. Put your clock where you can easily see it. · Stay active. Take walks in familiar places, or with friends or loved ones. Try to stay active mentally too. Read and work crossword puzzles if you enjoy these activities. · Do not drive unless you can pass an on-road driving test. If you are not sure if you are safe to drive, your state 's license bureau can test you. · Keep a cordless phone and a flashlight with new batteries by your bed. If possible, put a phone in each of the main rooms of your house, or carry a cell phone in case you fall and cannot reach a phone. Or, you can wear a device around your neck or wrist. You push a button that sends a signal for help. Acknowledge your emotions and plan for the future · Talk openly and honestly with your doctor. · Let yourself grieve. It is common to feel angry, scared, frustrated, anxious, or depressed. · Get emotional support from family, friends, a support group, or a counselor experienced in working with people who have dementia. · Ask for help if you need it. · Plan for the future. ¨ Talk to your family and doctor about preparing a living will and other important papers while you can make decisions. These papers tell your doctors how to care for you at the end of your life. ¨ Consider naming a person to make decisions about your care if you are not able to. When should you call for help? Call 911 anytime you think you may need emergency care. For example, call if: 
? · You are lost and do not know whom to call. ? · You are injured and do not know whom to call. ?Call your doctor now or seek immediate medical care if: 
? · You are more confused or upset than usual.  
 ? · You feel like you could hurt yourself because your mind is not working well. ? Watch closely for changes in your health, and be sure to contact your doctor if you have any problems. Where can you learn more? Go to http://kenneth-ramon.info/. Enter N401 in the search box to learn more about \"Dementia: Care Instructions. \" Current as of: May 12, 2017 Content Version: 11.4 © 4564-4945 [employer]. Care instructions adapted under license by [employer] (which disclaims liability or warranty for this information). If you have questions about a medical condition or this instruction, always ask your healthcare professional. [address] any warranty or liability for your use of this information. Learning About the Symptoms of Dementia What is dementia? We all forget things as we get older. Many older people have a slight loss of memory that does not affect their daily lives. But memory loss that gets worse may mean that you have dementia. Dementia is a loss of mental skills that affects your daily life. It can cause problems with memory, problem-solving, and learning. It also can cause problems with thinking and planning. Dementia usually gets worse over time. But how quickly it gets worse is different for each person. Some people stay the same for years. Others lose skills quickly. Your chances of having dementia rise as you get older. But this doesn't mean that everyone will get it. What causes dementia? Dementia is caused by damage to or changes in the brain. Alzheimer's disease is the most common kind of dementia. Alzheimer's causes a steady loss of memory and how well you can speak, think, and do your daily activities. The second most common kind of dementia is caused by a series of strokes. It's called vascular dementia. It often gets worse step by step.  With each new stroke, more mental skills are lost. 
 Serious head injuries in the past can sometimes lead to dementia, too. What are the symptoms? Usually the first symptom of dementia is memory loss. Often the person who has the memory problem doesn't notice it, but family and friends do. People who have dementia may have increasing trouble with: 
· Recalling recent events. They may forget appointments or lose objects. · Recognizing people and places. · Keeping up with conversations and activity. · Finding their way around familiar places, or driving to and from places they know well. · Keeping up personal care such as grooming or bathing. · Planning and carrying out routine tasks. They may have trouble following a recipe or writing a letter or email. What are some next steps? If you are worried about memory loss, see your doctor soon. He or she can do a physical exam and ask questions about recent and past illnesses and life events. Think about bringing someone to your doctor's appointment to take notes for you. Your doctor may suggest a series of tests to measure memory changes over time. These tests give the doctor an overall picture of how well your brain is working. The doctor can use the results to decide the best treatment program and help make your life safer and easier. It is important to know that memory loss can be caused by things other than dementia. These things can include health problems such as depression, a low amount of thyroid hormone, and some infections. When those things are treated, your ability to remember can get better. Follow-up care is a key part of your treatment and safety. Be sure to make and go to all appointments, and call your doctor if you are having problems. It's also a good idea to know your test results and keep a list of the medicines you take. Where can you learn more? Go to http://kenneth-ramon.info/.  
Enter [id number] in the search box to learn more about \"Learning About the Follow-up care is a key part of your loved one's treatment and safety. Be sure to make and go to all appointments, and call your doctor if your loved one is having problems. It's also a good idea to know your loved one's test results and keep a list of the medicines he or she takes. How can you care for your loved one at home? Taking care of the person · If the person takes medicine for dementia, help him or her take it exactly as prescribed. Call the doctor if you notice any problems with the medicine. · Make a list of the person's medicines. Review it with all of his or her doctors. · Help the person eat a balanced diet. Serve plenty of whole grains, fruits, and vegetables every day. If the person is not hungry at mealtimes, give snacks at midmorning and in the afternoon. Offer drinks such as Boost, Ensure, or Sustacal if the person is losing weight. · Encourage exercise. Walking and other activities may slow the decline of mental ability. Help the person stay active mentally with reading, crossword puzzles, or other hobbies. · Take steps to help if the person is sundowning. This is the restless behavior and trouble with sleeping that may occur in late afternoon and at night. Try not to let the person nap during the day. Offer a glass of warm milk or caffeine-free tea before bedtime. · Develop a routine. Your loved one will feel less frustrated or confused with a clear, simple plan of what to do every day. · Be patient. A task may take the person longer than it used to. · For as long as he or she is able, allow your loved one to make decisions about activities, food, clothing, and other choices. Let him or her be independent, even if tasks take more time or are not done perfectly. Tailor tasks to the person's abilities. For example, if cooking is no longer safe, ask for other help. Your loved one can help set the table, or make simple dishes such as a salad. When the person needs help, offer it gently. Staying safe · Make your home (or your loved one's home) safe. Tack down rugs, and put no-slip tape in the tub. Install handrails, and put safety switches on stoves and appliances. Keep rooms free of clutter. Make sure walkways around furniture are clear. Do not move furniture around, because the person may become confused. · Use locks on doors and cupboards. Lock up knives, scissors, medicines, cleaning supplies, and other dangerous things. · Do not let the person drive or cook if he or she can't do it safely. A person with dementia should not drive unless he or she is able to pass an on-road driving test. Your state 's license bureau can do a driving test if there is any question. · Get medical alert jewelry for the person so that you can be contacted if he or she wanders away. If possible, provide a safe place for wandering, such as an enclosed yard or garden. Taking care of yourself · Ask your doctor about support groups and other resources in your area. · Take care of your health. Be sure to eat healthy foods and get enough rest and exercise. · Take time for yourself. Respite services provide someone to stay with the person for a short time while you get out of the house for a few hours. · Make time for an activity that you enjoy. Read, listen to music, paint, do crafts, or play an instrument, even if it's only for a few minutes a day. · Spend time with family, friends, and others in your support system. When should you call for help? Call 911 anytime you think the person may need emergency care. For example, call if: 
? · The person who has dementia wanders away and you can't find him or her. ? · The person who has dementia is seriously injured. ?Call the doctor now or seek immediate medical care if: 
? · The person suddenly sees things that are not there (hallucinates). ? · The person has a sudden change in his or her behavior. ?Watch closely for changes in the person's health, and be sure to contact the doctor if: 
? · The person has symptoms that could cause injury. ? · The person has problems with his or her medicine. ? · You need more information to care for a person with dementia. ? · You need respite care so you can take a break. Where can you learn more? Go to http://kenneth-ramon.info/. Enter W914 in the search box to learn more about \"Helping A Person With Dementia: Care Instructions. \" Current as of: May 12, 2017 Content Version: 11.4 © 5495-5709 [employer]. Care instructions adapted under license by [employer] (which disclaims liability or warranty for this information). If you have questions about a medical condition or this instruction, always ask your healthcare professional. [address] any warranty or liability for your use of this information. Introducing [facility]! Dear Nuvia Munguia: Thank you for requesting a Mama account. Our records indicate that you already have an active Mama account. You can access your account anytime at [email or web address] Did you know that you can access your hospital and ER discharge instructions at any time in Mama? You can also review all of your test results from your hospital stay or ER visit. Additional Information If you have questions, please visit the Frequently Asked Questions section of the Mama website at [email or web address] Remember, Mama is NOT to be used for urgent needs. For medical emergencies, dial 911. Now available from your iPhone and Android! Please provide this summary of care documentation to your next provider. Your primary care clinician is listed as [address]. If you have any questions after today's visit, please call [phone].

## 2017-11-14 NOTE — PATIENT INSTRUCTIONS
Influenza (Flu) Vaccine (Inactivated or Recombinant): What You Need to Know  Why get vaccinated? Influenza (\"flu\") is a contagious disease that spreads around the [place] every winter, usually between October and May. Flu is caused by influenza viruses and is spread mainly by coughing, sneezing, and close contact. Anyone can get flu. Flu strikes suddenly and can last several days. Symptoms vary by age, but can include:  · Fever/chills. · Sore throat. · Muscle aches. · Fatigue. · Cough. · Headache. · Runny or stuffy nose. Flu can also lead to pneumonia and blood infections, and cause diarrhea and seizures in children. If you have a medical condition, such as heart or lung disease, flu can make it worse. Flu is more dangerous for some people. Infants and young children, people 72years of age and older, pregnant women, and people with certain health conditions or a weakened immune system are at greatest risk. Each year thousands of people in the [facility] die from flu, and many more are hospitalized. Flu vaccine can:  · Keep you from getting flu. · Make flu less severe if you do get it. · Keep you from spreading flu to your family and other people. Inactivated and recombinant flu vaccines  A dose of flu vaccine is recommended every flu season. Children 6 months through 6years of age may need two doses during the same flu season. Everyone else needs only one dose each flu season. Some inactivated flu vaccines contain a very small amount of a mercury-based preservative called thimerosal. Studies have not shown thimerosal in vaccines to be harmful, but flu vaccines that do not contain thimerosal are available. There is no live flu virus in flu shots. They cannot cause the flu. There are many flu viruses, and they are always changing. Each year a new flu vaccine is made to protect against three or four viruses that are likely to cause disease in the upcoming flu season.  But even when the vaccine doesn't exactly match these viruses, it may still provide some protection. Flu vaccine cannot prevent:  · Flu that is caused by a virus not covered by the vaccine. · Illnesses that look like flu but are not. Some people should not get this vaccine  Tell the person who is giving you the vaccine:  · If you have any severe (life-threatening) allergies. If you ever had a life-threatening allergic reaction after a dose of flu vaccine, or have a severe allergy to any part of this vaccine, you may be advised not to get vaccinated. Most, but not all, types of flu vaccine contain a small amount of egg protein. · If you ever had Guillain-Barré syndrome (also called GBS) Some people with a history of GBS should not get this vaccine. This should be discussed with your doctor. · If you are not feeling well. It is usually okay to get flu vaccine when you have a mild illness, but you might be asked to come back when you feel better. Risks of a vaccine reaction  With any medicine, including vaccines, there is a chance of reactions. These are usually mild and go away on their own, but serious reactions are also possible. Most people who get a flu shot do not have any problems with it. Minor problems following a flu shot include:  · Soreness, redness, or swelling where the shot was given  · Hoarseness  · Sore, red or itchy eyes  · Cough  · Fever  · Aches  · Headache  · Itching  · Fatigue  If these problems occur, they usually begin soon after the shot and last 1 or 2 days. More serious problems following a flu shot can include the following:  · There may be a small increased risk of Guillain-Barré Syndrome (GBS) after inactivated flu vaccine. This risk has been estimated at 1 or 2 additional cases per million people vaccinated. This is much lower than the risk of severe complications from flu, which can be prevented by flu vaccine.   · Eliel Ards children who get the flu shot along with pneumococcal vaccine (PCV13) and/or DTaP vaccine at the same time might be slightly more likely to have a seizure caused by fever. Ask your doctor for more information. Tell your doctor if a child who is getting flu vaccine has ever had a seizure  Problems that could happen after any injected vaccine:  · People sometimes faint after a medical procedure, including vaccination. Sitting or lying down for about 15 minutes can help prevent fainting, and injuries caused by a fall. Tell your doctor if you feel dizzy, or have vision changes or ringing in the ears. · Some people get severe pain in the shoulder and have difficulty moving the arm where a shot was given. This happens very rarely. · Any medication can cause a severe allergic reaction. Such reactions from a vaccine are very rare, estimated at about 1 in a million doses, and would happen within a few minutes to a few hours after the vaccination. As with any medicine, there is a very remote chance of a vaccine causing a serious injury or death. The safety of vaccines is always being monitored. For more information, visit: www.cdc.gov/vaccinesafety/. What if there is a serious reaction? What should I look for? · Look for anything that concerns you, such as signs of a severe allergic reaction, very high fever, or unusual behavior. Signs of a severe allergic reaction can include hives, swelling of the face and throat, difficulty breathing, a fast heartbeat, dizziness, and weakness - usually within a few minutes to a few hours after the vaccination. What should I do? · If you think it is a severe allergic reaction or other emergency that can't wait, call 9-1-1 and get the person to the nearest hospital. Otherwise, call your doctor. · Reactions should be reported to the \"Vaccine Adverse Event Reporting System\" (VAERS). Your doctor should file this report, or you can do it yourself through the VAERS website at www.vaers. [facility].gov, or by calling 8-191.386.6963.   iCurrent does not give medical advice. The National Vaccine Injury Compensation Program  The National Vaccine Injury Compensation Program (VICP) is a federal program that was created to compensate people who may have been injured by certain vaccines. Persons who believe they may have been injured by a vaccine can learn about the program and about filing a claim by calling 7-307.271.7883 or visiting the [employer] website at www.[facility].gov/vaccinecompensation. There is a time limit to file a claim for compensation. How can I learn more? · Ask your healthcare provider. He or she can give you the vaccine package insert or suggest other sources of information. · Call your local or state health department. · Contact the Centers for Disease Control and Prevention (CDC):  ¨ Call 0-884.283.9949 (1-800-CDC-INFO) or  ¨ Visit CDC's website at www.cdc.gov/flu  Vaccine Information Statement  Inactivated Influenza Vaccine  8/7/2015)  42 SHARIFA Marquis  [id number]-[id number]  Department of Health and Human Services  Centers for Disease Control and Prevention  Many Vaccine Information Statements are available in [place] and other languages. See www.immunize.org/vis. Muchas hojas de información sobre vacunas están disponibles en español y en otros idiomas. Visite www.immunize.org/vis. Care instructions adapted under license by [employer] (which disclaims liability or warranty for this information). If you have questions about a medical condition or this instruction, always ask your healthcare professional. [place] [address] any warranty or liability for your use of this information. Pneumococcal Polysaccharide Vaccine: Care Instructions  Your Care Instructions    The pneumococcal polysaccharide vaccine (PPSV) can prevent some of the serious complications of pneumonia. This includes infection in the bloodstream (bacteremia) or throughout the body (septicemia). PPSV is recommended for people ages 72 years and older.  People ages 3 to 59 who have a long-term illness should also get the vaccine. This includes people with diabetes, heart disease, liver disease, or lung disease. PPSV can also help people who have a weakened immune system. This includes cancer patients and people who don't have a spleen. The immune system helps your body fight infection and other illnesses. PPSV is given as a shot. It's usually given in the arm. Healthy older adults get the shot once. Other people may need to have a second dose. The shot may cause pain and redness at the site. It may also cause a mild fever for a short time. Follow-up care is a key part of your treatment and safety. Be sure to make and go to all appointments, and call your doctor if you are having problems. It's also a good idea to know your test results and keep a list of the medicines you take. How can you care for yourself at home? · Take an over-the-counter pain medicine, such as acetaminophen (Tylenol), ibuprofen (Advil, Motrin), or naproxen (Aleve), if your arm is sore after the shot. Be safe with medicines. Read and follow all instructions on the label. · Give acetaminophen (Tylenol) or ibuprofen (Advil, Motrin) to your child for pain or fussiness after the shot. Read and follow all instructions on the label. Do not give aspirin to anyone younger than 20. It has been linked to Reye syndrome, a serious illness. · Put ice or a cold pack on the sore area for 10 to 20 minutes at a time. Put a thin cloth between the ice and your skin. When should you call for help? Call 911 anytime you think you may need emergency care. For example, call if:  ? · You have a seizure. ? · You have symptoms of a severe allergic reaction. These may include:  ¨ Sudden raised, red areas (hives) all over the body. ¨ Swelling of the throat, mouth, lips, or tongue. ¨ Trouble breathing. ¨ Passing out (losing consciousness). Or you may feel very lightheaded or suddenly feel weak, confused, or restless.    ?Call your doctor now or seek immediate medical care if:  ? · You have symptoms of an allergic reaction, such as:  ¨ A rash or hives (raised, red areas on the skin). ¨ Itching. ¨ Swelling. ¨ Belly pain, nausea, or vomiting. ? · You have a high fever. ? Watch closely for changes in your health, and be sure to contact your doctor if you have any problems. Where can you learn more? Go to http://kenneth-ramon.info/. Enter T225 in the search box to learn more about \"Pneumococcal Polysaccharide Vaccine: Care Instructions. \"  Current as of: September 24, 2016  Content Version: 11.4  © 4684-8283 [employer]. Care instructions adapted under license by [employer] (which disclaims liability or warranty for this information). If you have questions about a medical condition or this instruction, always ask your healthcare professional. [place] [address] any warranty or liability for your use of this information. Alzheimer's Disease: Care Instructions  Your Care Instructions    Alzheimer's disease is a type of dementia. It causes memory loss and affects judgment, language, and behavior. You may have trouble making decisions or may get lost in places that you used to know well. Alzheimer's disease is different than mild memory loss that occurs with aging. It is not clear what causes Alzheimer's disease, but it is the most common form of dementia in older adults. Finding out that you have this disease is a shock. You may be afraid and worried about how the condition will change your life. Although there is no cure at this time, medicine in some cases may slow memory loss for a while. Other medicines may be able to help you sleep or cope with depression and behavior changes. Alzheimer's disease is different for everyone. It may take many years to develop. In some cases, people can function well for a long time.  In the early stage of the disease, you can do things at home to make life easier and safer. You also can keep doing your hobbies and other activities. Many people find comfort in planning now for their future needs. Follow-up care is a key part of your treatment and safety. Be sure to make and go to all appointments, and call your doctor if you are having problems. It's also a good idea to know your test results and keep a list of the medicines you take. How can you care for yourself at home? Taking care of yourself  · If your doctor gives you medicines, take them exactly as prescribed. Call your doctor if you think you are having a problem with your medicine. You will get more details on the medicines your doctor prescribes. · Eat a balanced diet. Get plenty of whole grains, fruits, and vegetables every day. If you are not hungry at mealtimes, eat snacks at midmorning and in the afternoon. Try drinks such as Boost, Ensure, or Sustacal if you are having trouble keeping your weight up. · Stay active. Exercise such as walking may slow the decline of your mental abilities. Try to stay active mentally too. Read and work crossword puzzles if you enjoy these activities. · If you have trouble sleeping, do not nap during the day. Get regular exercise (but not within several hours of bedtime). Drink a glass of warm milk or caffeine-free herbal tea before going to bed. · Ask your doctor about support groups and other resources in your area. They can help people who have Alzheimer's disease and their families. · Be patient. You may find that a task takes you longer than it used to. · If you have not already done so, make a list of advance directives. Advance directives are instructions to your doctor and family members about what kind of care you want if you become unable to speak or express yourself. Talk to a  about making a will, if you do not already have one. Keeping schedules  · Develop a routine.  You will feel less frustrated or confused if you have a clear, simple plan of what to do every day. ¨ Make lists of your medicines and when to take them. ¨ Write down appointments and other tasks in a calendar. ¨ Put sticky notes around the house to help you remember events and other things you have to do. ¨ Schedule activities and tasks for times of the day when you are best able to handle them. Staying safe  · Tell someone when you are going out and where you are going. Let the person know when you will be back. Before you go out alone, write down where you are going, how to get there, and how to get back home. Do this even if you have gone there many times before. Take someone along with you when possible. · Make your home safe. Tack down rugs, put no-slip tape in the tub, use handrails, and put safety switches on stoves and appliances. · Have a family member or other caregiver tell you whether you are driving badly. Deciding to stop driving is very hard for many people. Driving helps you feel independent. Your state 's license bureau can do a driving test if there is any question. Plan for other means of getting around when you are no longer able to drive. · Use strong lighting, especially at night. Put night-lights in bedrooms, hallways, and bathrooms. · Lower the hot water temperature setting to 120°F or lower to avoid burns. When should you call for help? Call 911 anytime you think you may need emergency care. For example, call if:  ? · You are lost and do not know whom to call. ? · You are injured and do not know whom to call. ?Call your doctor now or seek immediate medical care if:  ? · Your symptoms suddenly get much worse. ? Watch closely for changes in your health, and be sure to contact your doctor if:  ? · You want more information about how you can take care of yourself. Where can you learn more? Go to http://kenneth-ramon.info/. Enter Y179 in the search box to learn more about \"Alzheimer's Disease: Care Instructions. \"  Current as of: May 12, 2017  Content Version: 11.4  © 2136-1738 [employer] Care instructions adapted under license by [employer] (which disclaims liability or warranty for this information). If you have questions about a medical condition or this instruction, always ask your healthcare professional. [address] any warranty or liability for your use of this information. Helping a Person With Alzheimer's Disease: Care Instructions  Your Care Instructions    Alzheimer's disease is a type of dementia. It affects memory, intelligence, judgment, language, and behavior. It is not clear what causes this disease. But it is the most common form of dementia in older adults. It may take many years to develop. Alzheimer's disease is different than mild memory loss that occurs with aging. Family members usually notice symptoms first. But the person also may realize that something is wrong. Follow-up care is a key part of your loved one's treatment and safety. Be sure to make and go to all appointments, and call your doctor if your loved one is having problems. It's also a good idea to know your loved one's test results and keep a list of the medicines he or she takes. How can you care for your loved one at home? · Develop a routine. The person will feel less frustrated or confused with a clear, simple daily plan. Remind him or her about important facts and events. · Be patient. It may take longer for the person to complete a task than it used to. · Help the person eat a balanced diet. Serve plenty of whole grains, fruits, and vegetables every day. If the person is not eating well at mealtimes, give snacks at midmorning and in the afternoon. Offer drinks such as Boost, Ensure, or Sustacal if he or she is losing weight. · Encourage exercise. Walking and other activity may slow the decline of mental ability. Help the person keep an active mind.  Encourage hobbies such as reading and crossword puzzles. · Take steps to help if the person is sundowning. This is the restless behavior and trouble with sleeping that may occur in late afternoon and at night. Try not to let the person nap during the day. Offer a glass of warm milk or caffeine-free tea before bedtime. · Ask family members and friends for help. You may need breaks where others can help care for the person. · Talk to the person's doctor about what resources are available for help in your area. · Review all of the person's medicines with his or her doctor. · For as long as the person is able, allow him or her to make decisions about activities, food, clothing, and other choices. Let the person be independent, even if tasks take more time or are not done perfectly. Tailor tasks to the person's abilities. For example, if cooking is no longer safe, ask for other help. He or she can help set the table or make simple dishes such as a salad. When the person needs help, offer it gently. Keeping safe  · Make your home (or the person's home) safe. Tack down rugs, and put no-slip tape in the tub. Install handrails, and put safety switches on stoves and appliances. Keep rooms free of clutter. Make sure walkways around furniture are clear. Do not move furniture around, because the person may become confused. · Use locks on doors and cupboards. Lock up knives, scissors, medicines, cleaning supplies, and other dangerous things. · Do not let the person drive or cook if he or she cannot do it safely. A person with Alzheimer's should not drive unless he or she is able to pass an on-road driving test. Your state 's license bureau can do a driving test if there is any question. · Get medical alert jewelry for the person so you can be contacted if he or she wanders away. If possible, provide a safe place for wandering, such as an enclosed yard or garden. When should you call for help?   Call 911 anytime you think you may need emergency care. For example, call if:  ? · A person who has Alzheimer's disease has disappeared. ? · A person who has Alzheimer's disease is seriously injured. ?Call your doctor now or seek immediate medical care if:  ? · The person you are caring for suddenly sees or hears things that are not there (hallucinates). ? · The person you are caring for has a sudden, drastic change in his or her behavior. ? Watch closely for changes in your loved one's health, and be sure to contact the doctor if:  ? · A person who has Alzheimer's disease gradually gets worse or has symptoms that could cause injury. ? · You need help caring for a person with Alzheimer's disease. ? · The person has problems with his or her medicine. Where can you learn more? Go to http://kenneth-ramon.info/. Enter T162 in the search box to learn more about \"Helping a Person With Alzheimer's Disease: Care Instructions. \"  Current as of: May 12, 2017  Content Version: 11.4  © 3638-6532 [employer]. Care instructions adapted under license by [employer] (which disclaims liability or warranty for this information). If you have questions about a medical condition or this instruction, always ask your healthcare professional. [address] any warranty or liability for your use of this information. Dementia: Care Instructions  Your Care Instructions    Dementia is a loss of mental skills that affects your daily life. It is different than the occasional trouble with memory that is part of aging. You may find it hard to remember things that you feel you should be able to remember. Or you may feel that your mind is just not working as well as usual.  Finding out that you have dementia is a shock. You may be afraid and worried about how the condition will change your life. Although there is no cure at this time, medicine may slow memory loss and improve thinking for a while.  Other medicines may be able to help you sleep or cope with depression and behavior changes. Dementia often gets worse slowly. But it can get worse quickly. As dementia gets worse, it may become harder to do common things that take planning, like making a list and going shopping. Over time, the disease may make it hard for you to take care of yourself. Some people with dementia need others to help care for them. Dementia is different for everyone. You may be able to function well for a long time. In the early stage of the condition, you can do things at home to make life easier and safer. You also can keep doing your hobbies and other activities. Many people find comfort in planning now for their future needs. Follow-up care is a key part of your treatment and safety. Be sure to make and go to all appointments, and call your doctor if you are having problems. It's also a good idea to know your test results and keep a list of the medicines you take. How can you care for yourself at home? · Take your medicines exactly as prescribed. Call your doctor if you think you are having a problem with your medicine. · Eat healthy foods. Eat lots of whole grains, fruits, and vegetables every day. If you are not hungry, try snacks or nutritional drinks such as Boost, Ensure, or Sustacal.  · If you have problems sleeping:  ¨ Try not to nap too close to your bedtime. ¨ Exercise regularly. Walking is a good choice. ¨ Try a glass of warm milk or caffeine-free herbal tea before bed. · Do tasks and activities during the time of day when you feel your best. It may help to develop a daily routine. · Post labels, lists, and sticky notes to help you remember things. Write your activities on a calendar you can easily find. Put your clock where you can easily see it. · Stay active. Take walks in familiar places, or with friends or loved ones. Try to stay active mentally too. Read and work crossword puzzles if you enjoy these activities.   · Do not drive unless you can pass an on-road driving test. If you are not sure if you are safe to drive, your state 's license bureau can test you. · Keep a cordless phone and a flashlight with new batteries by your bed. If possible, put a phone in each of the main rooms of your house, or carry a cell phone in case you fall and cannot reach a phone. Or, you can wear a device around your neck or wrist. You push a button that sends a signal for help. Acknowledge your emotions and plan for the future  · Talk openly and honestly with your doctor. · Let yourself grieve. It is common to feel angry, scared, frustrated, anxious, or depressed. · Get emotional support from family, friends, a support group, or a counselor experienced in working with people who have dementia. · Ask for help if you need it. · Plan for the future. ¨ Talk to your family and doctor about preparing a living will and other important papers while you can make decisions. These papers tell your doctors how to care for you at the end of your life. ¨ Consider naming a person to make decisions about your care if you are not able to. When should you call for help? Call 911 anytime you think you may need emergency care. For example, call if:  ? · You are lost and do not know whom to call. ? · You are injured and do not know whom to call. ?Call your doctor now or seek immediate medical care if:  ? · You are more confused or upset than usual.   ? · You feel like you could hurt yourself because your mind is not working well. ? Watch closely for changes in your health, and be sure to contact your doctor if you have any problems. Where can you learn more? Go to http://kenneth-ramon.info/. Enter U203 in the search box to learn more about \"Dementia: Care Instructions. \"  Current as of: May 12, 2017  Content Version: 11.4  © 2969-3641 Healthwise, Incorporated.  Care instructions adapted under license by [employer] (which disclaims liability or warranty for this information). If you have questions about a medical condition or this instruction, always ask your healthcare professional. [address] any warranty or liability for your use of this information. Learning About the Symptoms of Dementia  What is dementia? We all forget things as we get older. Many older people have a slight loss of memory that does not affect their daily lives. But memory loss that gets worse may mean that you have dementia. Dementia is a loss of mental skills that affects your daily life. It can cause problems with memory, problem-solving, and learning. It also can cause problems with thinking and planning. Dementia usually gets worse over time. But how quickly it gets worse is different for each person. Some people stay the same for years. Others lose skills quickly. Your chances of having dementia rise as you get older. But this doesn't mean that everyone will get it. What causes dementia? Dementia is caused by damage to or changes in the brain. Alzheimer's disease is the most common kind of dementia. Alzheimer's causes a steady loss of memory and how well you can speak, think, and do your daily activities. The second most common kind of dementia is caused by a series of strokes. It's called vascular dementia. It often gets worse step by step. With each new stroke, more mental skills are lost.  Serious head injuries in the past can sometimes lead to dementia, too. What are the symptoms? Usually the first symptom of dementia is memory loss. Often the person who has the memory problem doesn't notice it, but family and friends do. People who have dementia may have increasing trouble with:  · Recalling recent events. They may forget appointments or lose objects. · Recognizing people and places. · Keeping up with conversations and activity.   · Finding their way around familiar places, or driving to and from places they know well. · Keeping up personal care such as grooming or bathing. · Planning and carrying out routine tasks. They may have trouble following a recipe or writing a letter or email. What are some next steps? If you are worried about memory loss, see your doctor soon. He or she can do a physical exam and ask questions about recent and past illnesses and life events. Think about bringing someone to your doctor's appointment to take notes for you. Your doctor may suggest a series of tests to measure memory changes over time. These tests give the doctor an overall picture of how well your brain is working. The doctor can use the results to decide the best treatment program and help make your life safer and easier. It is important to know that memory loss can be caused by things other than dementia. These things can include health problems such as depression, a low amount of thyroid hormone, and some infections. When those things are treated, your ability to remember can get better. Follow-up care is a key part of your treatment and safety. Be sure to make and go to all appointments, and call your doctor if you are having problems. It's also a good idea to know your test results and keep a list of the medicines you take. Where can you learn more? Go to http://kenneth-ramon.info/. Enter [id number] in the search box to learn more about \"Learning About the Symptoms of Dementia. \"  Current as of: May 12, 2017  Content Version: 11.4  © 1835-6244 Healthwise, Incorporated. Care instructions adapted under license by [employer] (which disclaims liability or warranty for this information). If you have questions about a medical condition or this instruction, always ask your healthcare professional. [place] [address] any warranty or liability for your use of this information.        Helping A Person With Dementia: Care Instructions  Your Care Instructions    Dementia is a loss of mental skills that affects daily life. It is different from mild memory loss that occurs with aging. Dementia can cause problems with memory, thinking clearly, and planning. It is different for everyone. But it usually gets worse slowly. Some people who have dementia can function well for a long time. But at some point it may become hard for the person to care for himself or herself. It can be upsetting to learn that a loved one has this condition. You may be afraid and worried about what will happen. You may wonder how you will care for the person. There is no cure for dementia. But medicine may be able to slow memory loss and improve thinking for a while. Other medicines may help with sleep, depression, and behavior changes. Dementia is different for everyone. In some cases, people can function well for a long time. You can help your loved one by making his or her home life easier and safer. You also need to take care of yourself. Caregiving can be stressful. But support is available to help you and give you a break when you need it. The Alzheimer's Association offers good information and support. If you are caring for someone with dementia, you can help make life safer and more comfortable. You can also help your loved one make decisions about future care. You may also want to bring up legal and financial issues. These are hard but important conversations to have. Follow-up care is a key part of your loved one's treatment and safety. Be sure to make and go to all appointments, and call your doctor if your loved one is having problems. It's also a good idea to know your loved one's test results and keep a list of the medicines he or she takes. How can you care for your loved one at home? Taking care of the person  · If the person takes medicine for dementia, help him or her take it exactly as prescribed. Call the doctor if you notice any problems with the medicine. · Make a list of the person's medicines. Review it with all of his or her doctors. · Help the person eat a balanced diet. Serve plenty of whole grains, fruits, and vegetables every day. If the person is not hungry at mealtimes, give snacks at midmorning and in the afternoon. Offer drinks such as Boost, Ensure, or Sustacal if the person is losing weight. · Encourage exercise. Walking and other activities may slow the decline of mental ability. Help the person stay active mentally with reading, crossword puzzles, or other hobbies. · Take steps to help if the person is sundowning. This is the restless behavior and trouble with sleeping that may occur in late afternoon and at night. Try not to let the person nap during the day. Offer a glass of warm milk or caffeine-free tea before bedtime. · Develop a routine. Your loved one will feel less frustrated or confused with a clear, simple plan of what to do every day. · Be patient. A task may take the person longer than it used to. · For as long as he or she is able, allow your loved one to make decisions about activities, food, clothing, and other choices. Let him or her be independent, even if tasks take more time or are not done perfectly. Tailor tasks to the person's abilities. For example, if cooking is no longer safe, ask for other help. Your loved one can help set the table, or make simple dishes such as a salad. When the person needs help, offer it gently. Staying safe  · Make your home (or your loved one's home) safe. Tack down rugs, and put no-slip tape in the tub. Install handrails, and put safety switches on stoves and appliances. Keep rooms free of clutter. Make sure walkways around furniture are clear. Do not move furniture around, because the person may become confused. · Use locks on doors and cupboards. Lock up knives, scissors, medicines, cleaning supplies, and other dangerous things. · Do not let the person drive or cook if he or she can't do it safely.  A person with dementia should not drive unless he or she is able to pass an on-road driving test. Your state 's license bureau can do a driving test if there is any question. · Get medical alert jewelry for the person so that you can be contacted if he or she wanders away. If possible, provide a safe place for wandering, such as an enclosed yard or garden. Taking care of yourself  · Ask your doctor about support groups and other resources in your area. · Take care of your health. Be sure to eat healthy foods and get enough rest and exercise. · Take time for yourself. Respite services provide someone to stay with the person for a short time while you get out of the house for a few hours. · Make time for an activity that you enjoy. Read, listen to music, paint, do crafts, or play an instrument, even if it's only for a few minutes a day. · Spend time with family, friends, and others in your support system. When should you call for help? Call 911 anytime you think the person may need emergency care. For example, call if:  ? · The person who has dementia wanders away and you can't find him or her. ? · The person who has dementia is seriously injured. ?Call the doctor now or seek immediate medical care if:  ? · The person suddenly sees things that are not there (hallucinates). ? · The person has a sudden change in his or her behavior. ? Watch closely for changes in the person's health, and be sure to contact the doctor if:  ? · The person has symptoms that could cause injury. ? · The person has problems with his or her medicine. ? · You need more information to care for a person with dementia. ? · You need respite care so you can take a break. Where can you learn more? Go to http://kenneth-ramon.info/. Enter R656 in the search box to learn more about \"Helping A Person With Dementia: Care Instructions. \"  Current as of: May 12, 2017  Content Version: 11.4  © 3599-9020 Healthwise, [facility].  Care instructions adapted under license by [employer] (which disclaims liability or warranty for this information). If you have questions about a medical condition or this instruction, always ask your healthcare professional. [address] any warranty or liability for your use of this information.

## 2017-11-19 DIAGNOSIS — I10 ESSENTIAL HYPERTENSION: ICD-10-CM

## 2017-11-20 RX ORDER — AMLODIPINE BESYLATE 5 MG/1
TABLET ORAL
Qty: 90 TAB | Refills: 3 | Status: SHIPPED | OUTPATIENT
Start: 2017-11-20 | End: 2018-12-18 | Stop reason: SDUPTHER

## 2018-02-19 RX ORDER — BUPROPION HYDROCHLORIDE 150 MG/1
TABLET, EXTENDED RELEASE ORAL
Qty: 90 TAB | Refills: 1 | Status: SHIPPED | OUTPATIENT
Start: 2018-02-19 | End: 2018-10-06 | Stop reason: SDUPTHER

## 2018-02-26 ENCOUNTER — TELEPHONE (OUTPATIENT)
Dept: PULMONOLOGY | Age: 58
End: 2018-02-26

## 2018-02-26 NOTE — TELEPHONE ENCOUNTER
Pt started on Breo in Nov. Has started with thrush in month. This is the second time since using the [facility]. Pt states she rinses month after use and also brushes her teeth but did not help with it coming back. The first episode of thrush she went to urgent care and tx with Fluconazole x days and nystatin swish and swallow which cleared it. Pt had nystatin left over and has tried but is not clearing and has now ran out of that too. Pt needs something for the thrush and also hoping to change to another inhaler from [facility].

## 2018-02-26 NOTE — TELEPHONE ENCOUNTER
Pt has appt on 3/23 but she would like to speak with something about the breo inhaler she has been using. She said it has given her thrush two times.  Please call [phone]

## 2018-02-27 RX ORDER — CLOTRIMAZOLE 10 MG/1
10 LOZENGE ORAL; TOPICAL
Qty: 50 TAB | Refills: 0 | Status: SHIPPED | OUTPATIENT
Start: 2018-02-27 | End: 2018-03-09

## 2018-02-27 RX ORDER — BUDESONIDE AND FORMOTEROL FUMARATE DIHYDRATE 80; 4.5 UG/1; UG/1
2 AEROSOL RESPIRATORY (INHALATION) 2 TIMES DAILY
Qty: 1 INHALER | Refills: 5 | Status: SHIPPED | OUTPATIENT
Start: 2018-02-27 | End: 2018-03-23

## 2018-03-23 ENCOUNTER — OFFICE VISIT (OUTPATIENT)
Dept: PULMONOLOGY | Age: 58
End: 2018-03-23

## 2018-03-23 VITALS
TEMPERATURE: 98 F | OXYGEN SATURATION: 99 % | DIASTOLIC BLOOD PRESSURE: 60 MMHG | BODY MASS INDEX: 22.91 KG/M2 | RESPIRATION RATE: 16 BRPM | HEIGHT: 67 IN | SYSTOLIC BLOOD PRESSURE: 106 MMHG | WEIGHT: 146 LBS | HEART RATE: 71 BPM

## 2018-03-23 DIAGNOSIS — J45.31 MILD PERSISTENT ASTHMA WITH ACUTE EXACERBATION: Primary | ICD-10-CM

## 2018-03-23 DIAGNOSIS — M34.1 CREST SYNDROME (HCC): ICD-10-CM

## 2018-03-23 DIAGNOSIS — J47.0 BRONCHIECTASIS WITH ACUTE LOWER RESPIRATORY INFECTION (HCC): ICD-10-CM

## 2018-03-23 DIAGNOSIS — I34.1 MVP (MITRAL VALVE PROLAPSE): ICD-10-CM

## 2018-03-23 RX ORDER — DOXYCYCLINE 100 MG/1
100 CAPSULE ORAL 2 TIMES DAILY
Qty: 20 CAP | Refills: 0 | Status: SHIPPED | OUTPATIENT
Start: 2018-03-23 | End: 2018-04-02

## 2018-03-23 NOTE — PROGRESS NOTES
Ms. Tang Houston has a reminder for a \"due or due soon\" health maintenance. I have asked that she contact her primary care provider for follow-up on this health maintenance. Chief Complaint   Patient presents with    Asthma     1. Have you been to the ER, urgent care clinic since your last visit? Hospitalized since your last visit? No    2. Have you seen or consulted any other health care providers outside of the [address] [place] [address] since your last visit? Include any pap smears or colon screening.  No

## 2018-03-23 NOTE — MR AVS SNAPSHOT
[facility], [address] [address] 
[phone] Patient: Ac Lopez MRN: [id number] UN:[birth year] Visit Information Date & Time Provider Department Dept. Phone Encounter #  
 3/23/2018  8:45 AM Aydin Miguel MD [facility] [facility] [phone] [id number] Upcoming Health Maintenance Date Due  
 BREAST CANCER SCRN MAMMOGRAM 1/8/2018 PAP AKA CERVICAL CYTOLOGY 10/6/2018 COLONOSCOPY 5/13/2025 DTaP/Tdap/Td series (3 - Td) 11/14/2027 Allergies as of 3/23/2018  Review Complete On: 3/23/2018 By: Aydin Miguel MD  
  
 Severity Noted Reaction Type Reactions Bactrim [Sulfamethoxazole-trimethoprim]  06/29/2010    Rash Effexor [Venlafaxine]  06/29/2010   Side Effect Other (comments) Effect mitral valve prolapse Paxil [Paroxetine Hcl]  06/29/2010   Side Effect Other (comments)  
  medication would effect patients  mitral valve prolapse Penicillins  09/09/2013    Itching Sulfa (Sulfonamide Antibiotics)  06/29/2010    Rash  
 Zoloft [Sertraline]  06/29/2010   Side Effect Other (comments) Effect mitral valve prolapse Current Immunizations  Reviewed on 11/5/2013 Name Date Influenza Vaccine 10/7/2013 Influenza Vaccine (Quad) PF 11/14/2017, 11/10/2015, 10/7/2014 Pneumococcal Conjugate (PCV-13) 11/5/2013 Pneumococcal Polysaccharide (PPSV-23) 11/14/2017 Tdap 6/25/2007 Not reviewed this visit Vitals BP Pulse Temp Resp Height(growth percentile) Weight(growth percentile) 106/60 (BP 1 Location: Left arm, BP Patient Position: Sitting) 71 98 °F (36.7 °C) (Oral) 16 5' 7\" (1.702 m) 146 lb (66.2 kg) LMP SpO2 BMI OB Status Smoking Status 06/01/1992 99% 22.87 kg/m2 Hysterectomy Never Smoker BMI and BSA Data Body Mass Index Body Surface Area  
 22.87 kg/m 2 1.77 m 2 Preferred Pharmacy Pharmacy Name Phone [id number] [place], [address] [phone] Your Updated Medication List  
  
   
This list is accurate as of 3/23/18  9:22 AM.  Always use your most recent med list. amLODIPine 5 mg tablet Commonly known as:  Krystin Chiang TAKE 1 TABLET DAILY  
  
 aspirin delayed-release 81 mg tablet Take 81 mg by mouth daily. buPROPion  mg SR tablet Commonly known as:  WELLBUTRIN SR  
TAKE 1 TABLET DAILY CENTRUM SILVER ULTRA WOMEN'S PO Take  by mouth. fluticasone 50 mcg/actuation nasal spray Commonly known as:  Lulu Diya 2 Sprays by Both Nostrils route daily as needed for Rhinitis. Administer to right and left nostril. * fluticasone-salmeterol 115-21 mcg/actuation inhaler Commonly known as:  ADVAIR HFA Take 2 Puffs by inhalation two (2) times a day. * fluticasone-salmeterol 115-21 mcg/actuation inhaler Commonly known as:  ADVAIR HFA Take 2 Puffs by inhalation two (2) times a day. lansoprazole 30 mg capsule Commonly known as:  PREVACID Take 30 mg by mouth daily (before breakfast). loratadine 10 mg tablet Commonly known as:  Donnell Or Take 10 mg by mouth as needed. PLAQUENIL 200 mg tablet Generic drug:  hydroxychloroquine Take 200 mg by mouth two (2) times a day. PROAIR HFA 90 mcg/actuation inhaler Generic drug:  albuterol INHALE 2 PUFFS EVERY 4 HOURS AS NEEDED FOR WHEEZING  
  
 * Notice: This list has 2 medication(s) that are the same as other medications prescribed for you. Read the directions carefully, and ask your doctor or other care provider to review them with you. Prescriptions Sent to Pharmacy Refills  
 fluticasone-salmeterol (ADVAIR HFA) 115-21 mcg/actuation inhaler 5 Sig: Take 2 Puffs by inhalation two (2) times a day. Class: Normal  
 Pharmacy:  [facility], [address] [facility] Ph #: [phone] Route: Inhalation Introducing [facility]! Dear Mey Ruiz: Thank you for requesting a [employer] account. Our records indicate that you already have an active [employer] account. You can access your account anytime at [email or web address] Financial [email or web address]. [employer] Did you know that you can access your hospital and ER discharge instructions at any time in [employer]? You can also review all of your test results from your hospital stay or ER visit. Additional Information If you have questions, please visit the Frequently Asked Questions section of the [employer] website at [email or web address] Financial [email or web address]. [email or web address]/. Remember, [employer] is NOT to be used for urgent needs. For medical emergencies, dial 911. Now available from your iPhone and Android! Please provide this summary of care documentation to your next provider. Your primary care clinician is listed as [address]. If you have any questions after today's visit, please call [phone].

## 2018-03-23 NOTE — PROGRESS NOTES
HISTORY OF PRESENT ILLNESS  Mecca Hardy is a 59 y.o. female. HPI Comments: Follow up for bronchiectasis in right upper lobe area. Patient carries history of CREST syndrome for past five years. Before that she carried diagnosis of undifferentiated connective tissue disorder for many years. She also carries diagnosis of chronic bronchitis for past five years. She has been closely followed by Rheumatology and undergoes cardiac and pulmonary evaluation at regular interval. PFT performed in 2014 demonstrated mild obstructive and restrictive changes with baseline FEV1 around 73% of predicted. CT chest with contrast performed in December 2014 revealed mild bronchiectasis only involving right upper lobe area. She has a chronic cough with baseline yellowish phlegm production. From pulmonary prospective patient reports episodic SOB that usually occurs in extreme weather and or exposure to noxious stimulus such as cigarette smoke. She also reports subjective wheezing. She uses rescue inhaler with rapid improvement in breathing. She has noticed significant increase in pulmonary symptoms after acute bronchitis symptoms. She reports a history pneumonia in 1989. She denies any hospitalization related to respiratory process. As for her asthma, pt was well controlled on Symbicort for years, however control waned and pt was started on Breo. She noted excellent control on this but suffered several episodes of oral thrush and had to be placed back on Symbicort which to her did not work as well as Breo. She has had several mild exacerbations needing JN lately. Pt was treated at an urgent care facility recently. Review of Systems   Constitutional: Negative for chills, diaphoresis, fever, malaise/fatigue and weight loss. HENT: Positive for congestion. Negative for ear discharge, ear pain, hearing loss, nosebleeds, sore throat and tinnitus. Sinus pain: resolved.     Eyes: Negative for blurred vision, double vision, photophobia, pain, discharge and redness. Respiratory: Positive for cough, sputum production and wheezing. Negative for hemoptysis and stridor. Cardiovascular: Negative for palpitations, orthopnea, claudication, leg swelling and PND. Gastrointestinal: Negative for blood in stool, constipation, diarrhea, heartburn, melena, nausea and vomiting. Genitourinary: Negative for dysuria, flank pain, frequency, hematuria and urgency. Musculoskeletal: Negative for back pain, falls, joint pain, myalgias and neck pain. Skin: Negative for itching and rash. Neurological: Negative for dizziness, tingling, tremors, sensory change, speech change, focal weakness, seizures, loss of consciousness and weakness. Endo/Heme/Allergies: Negative for environmental allergies. Does not bruise/bleed easily. Psychiatric/Behavioral: Negative for depression, hallucinations, memory loss, substance abuse and suicidal ideas. The patient is nervous/anxious. The patient does not have insomnia. Past Medical History:   Diagnosis Date    Abnormal pap     ASCUS +HPV    Asthma 6/6/2012    Cancer ([facility] [address]     skin, basal cell    Contact dermatitis and other eczema, due to unspecified cause     basal cell cancer    ULISES (generalised anxiety disorder)     GERD (gastroesophageal reflux disease) 6/29/2010    Hypercholesterolemia     Step I diet    MVP (mitral valve prolapse)     Pneumonia     4 times    Sciatica 6/6/2012     Past Surgical History:   Procedure Laterality Date    HX COLONOSCOPY  5/13/2015    follow up 5 years    HX GYN      hysterectomy     Current Outpatient Prescriptions on File Prior to Visit   Medication Sig Dispense Refill    buPROPion SR (WELLBUTRIN SR) 150 mg SR tablet TAKE 1 TABLET DAILY 90 Tab 1    amLODIPine (NORVASC) 5 mg tablet TAKE 1 TABLET DAILY 90 Tab 3    loratadine (CLARITIN) 10 mg tablet Take 10 mg by mouth as needed.       PROAIR HFA 90 mcg/actuation inhaler INHALE 2 PUFFS EVERY 4 HOURS AS NEEDED FOR WHEEZING 1 Inhaler 0    lansoprazole (PREVACID) 30 mg capsule Take 30 mg by mouth daily (before breakfast).  hydroxychloroquine (PLAQUENIL) 200 mg tablet Take 200 mg by mouth two (2) times a day.  aspirin delayed-release 81 mg tablet Take 81 mg by mouth daily.  MULTIVITS W-FE,OTHER MIN/LUT (CENTRUM SILVER ULTRA WOMEN'S PO) Take  by mouth.  fluticasone (FLONASE) 50 mcg/actuation nasal spray 2 Sprays by Both Nostrils route daily as needed for Rhinitis. Administer to right and left nostril. 3 Bottle 3     No current facility-administered medications on file prior to visit. Allergies   Allergen Reactions    Bactrim [Sulfamethoxazole-Trimethoprim] Rash    Effexor [Venlafaxine] Other (comments)     Effect mitral valve prolapse    Paxil [Paroxetine Hcl] Other (comments)      medication would effect patients  mitral valve prolapse    Penicillins Itching    Sulfa (Sulfonamide Antibiotics) Rash    Zoloft [Sertraline] Other (comments)     Effect mitral valve prolapse     Social History     Social History    Marital status:      Spouse name: N/A    Number of children: N/A    Years of education: N/A     Occupational History    Not on file. Social History Main Topics    Smoking status: Never Smoker    Smokeless tobacco: Never Used    Alcohol use 0.0 oz/week     0 Standard drinks or equivalent per week      Comment: occasional    Drug use: No    Sexual activity: Yes     Partners: Male     Other Topics Concern    Not on file     Social History Narrative     Blood pressure 106/60, pulse 71, temperature 98 °F (36.7 °C), temperature source Oral, resp. rate 16, height 5' 7\" (1.702 m), weight 66.2 kg (146 lb), last menstrual period 06/01/1992, SpO2 99 %. Physical Exam   Constitutional: She is oriented to person, place, and time. She appears well-developed and well-nourished. No distress. HENT:   Head: Normocephalic and atraumatic.    Nose: Nose normal.   Mouth/Throat: No oropharyngeal exudate. Eyes: Conjunctivae and EOM are normal. Pupils are equal, round, and reactive to light. Right eye exhibits no discharge. Left eye exhibits no discharge. Neck: No JVD present. No tracheal deviation present. No thyromegaly present. Cardiovascular: Normal rate, regular rhythm, normal heart sounds and intact distal pulses. Exam reveals no gallop and no friction rub. No murmur heard. Pulmonary/Chest: Effort normal and breath sounds normal. No stridor. No respiratory distress. She has no wheezes. She has no rales. She exhibits no tenderness. Abdominal: Soft. She exhibits no mass. There is no tenderness. Musculoskeletal: She exhibits no edema, tenderness or deformity. Lymphadenopathy:     She has no cervical adenopathy. Neurological: She is alert and oriented to person, place, and time. Skin: Skin is warm and dry. No rash noted. She is not diaphoretic. No erythema. Psychiatric: She has a normal mood and affect. Her behavior is normal. Judgment and thought content normal.     Echo Cardiogram [id number]/14/2016  [address]  Component Name Value Ref Range   EF Echo 55     Result Impression   :   NORMAL LEFT VENTRICULAR SYSTOLIC FUNCTION WITH AN EJECTION FRACTION OF 79%   MILD DIASTOLIC DYSFUNCTION. THICKENED ANTERIOR LEAFLET OF THE MITRAL VALVE WITHOUT PROLAPSE   MILD TO MODERATE REGURGITATION   NORMAL PULMONARY ARTERY PRESSURE   NORMAL RIGHT VENTRICULAR SYSTOLIC FUNCTION. COMPARED WITH PRIOR ECHO DATED 6/24/14, CHANGES INCLUDE AN INCREASE IN MITRAL REGURGITATION   FROM TRACE TO MILD/MODERATE   PREVIOUSLY E' WAS 8.6 CENTIMETERS PER SECOND, THUS DIASTOLIC FUNCTION APPEARS SIMILAR. ALL OTHER FINDINGS REMAIN SIMILAR.      Clinical Indications: Palpitations; Mitral valve prolapse; [facility]   ICD Codes: R00.2; I34.1; M34.1 Technical Quality: Adequate     MEASUREMENTS:   2D ECHO    RV Diameter                       2.5 JZ                8.8-3.0   LV Diastolic Diameter Base LX     3.9 KD                7.5-2.1   LV Systolic Diameter Base MP      4.6 cm                2.3-4.0   LV Ejection Fraction 2D Teich     2.79   IVS Diastolic Thickness           0.93 cm               0.6-1.1 cm   LVPW Diastolic DKYFGSDIZ          3.62 cm               1.1-7.9 cm   LA Systolic Diameter LX           3.1 cm                2.1-3.7 cm   Aortic Root Diameter              3.5 cm                2.0-3.5 cm   LA Area 4C View                   16.8 cm²   LA Area 2C View                   16.4 cm²   LA Length 4C                      5.1 cm   LA Length 2C                      4.7 cm   LA Volume                         49.4 cm³     DOPPLER    Mitral E Point Velocity           75.6 cm/s   Mitral  A Point Velocity          82.2 cm/s   Mitral A Duration                 149 ms   Mitral E to A Ratio               0.92                  1.0 to 1.5   Mitral E to LV E' Lateral Ratio   9.3   MV Deceleration Time              277 ms                160-240 ms   Isovolumic Relaxation Time        88 ms                 70-90 ms   Pulm Vein Atrial Reversal Veloci  60.4 cm/s             <35 cm/s   Pulm Vein Atrial Duration         145 ms   MR Peak Velocity                  562 cm/s   MR Orifice PISA                   0.092 cm²   Mitral Regurgitant Volume         17.3 cm³   E Prime Velocity                  8.2 cm/s   RA Pressure (Entered Value)       3 mmHg   TR Peak Velocity                  2.1 m/s   TR Peak UNUAJHXP                  55 mmHg   RV Systolic WNABQPIV              23 mmHg       FINDINGS:     Left Ventricle: The left ventricle was normal in size   Normal wall thickness. Left Ventricle The left ventricular systolic function is normal.   Function: Mild diastolic dysfunction. No segmental wall motion abnormalities. LVEF: 55 %       Left Atrium: The left atrium was normal in size. The LA volume index was 28mL/m2   Right Ventricle:  The right ventricle was normal in size and function   Tricuspid Annular Plane Systolic Excursion (TAPSE) is 2.3cm. Tricuspid Annular Plane Systolic Velocity (TAPSV) is 14cm/s. Right Atrium: The right atrium was normal in size. Mitral Valve: Elongated, slightly thickened anterior mitral valve leaflet without prolapse. Mild to moderate mitral regurgitation. Aortic Valve: The aortic valve leaflets were normal without regurgitation or stenosis. Tricuspid Valve: The tricuspid valve was structurally normal with mild regurgitation. The estimated pulmonary artery pressure was 20mmHg. Pulmonic Valve: The pulmonic valve was structurally normal.   Aorta: The aortic root diameter was at the upper limits of normal = 3.5cm   Proximal ascending aorta = 3.5cm   Pericardium: The pericardium was normal.   Masses / Shunts: No masses, shunts or thrombi seen. Lisbeth Ruiz MD   (Electronically Signed)   Final Date: 2016 16:53   Result Narrative        ECHOCARDIOGRAPHIC REPORT     Exam Date: 2016 15:05 Gender: F Referring Physician: Matheus Lazo   Name: Cyndee Gray :  Sonographer: Adiel Abdi   CPI: [id number] BP: 122 / 62 Ht: 66 Wt: 143 BSA: 1.74     Type of Exam: ECHO CARDIOGRAM COMPLETE   Procedure: 2-D echocardiogram,Color flow analysis, Spectral Doppler analysis   ________     PFT's: combined obstruction and restriction, see attached. ASSESSMENT and PLAN  Encounter Diagnoses   Name Primary?  Mild persistent asthma with acute exacerbation Yes    Bronchiectasis with acute lower respiratory infection (HCC)     MVP (mitral valve prolapse)     CREST syndrome (HCC)      Pt off Breo and Symbicort due to side effects and ineffectiveness respectively. Will start therapeutic trial of Advair. Sample and inhaler technique education given, Rx sent. Continue rescue Albuterol as needed. Will defer discussion of RHC as pt with response to LABA/ICS and SOB has resolved. Continue rest of medications as listed.   Doxycycline ordered for Bronchiectasis exacerbation. Pt reminded on SSx of Asthma and bronchiectasis exacerbation and encouraged to call. RTC 5 months.   More then half of this 40 minute visit was spent in face to face counseling

## 2018-10-08 RX ORDER — BUPROPION HYDROCHLORIDE 150 MG/1
TABLET, EXTENDED RELEASE ORAL
Qty: 90 TAB | Refills: 1 | Status: SHIPPED | OUTPATIENT
Start: 2018-10-08 | End: 2019-04-14 | Stop reason: SDUPTHER

## 2018-10-18 ENCOUNTER — TELEPHONE (OUTPATIENT)
Dept: PULMONOLOGY | Age: 58
End: 2018-10-18

## 2018-10-18 RX ORDER — DOXYCYCLINE 100 MG/1
100 CAPSULE ORAL 2 TIMES DAILY
Qty: 20 CAP | Refills: 0 | Status: SHIPPED | OUTPATIENT
Start: 2018-10-18 | End: 2019-07-23 | Stop reason: ALTCHOICE

## 2018-10-18 NOTE — TELEPHONE ENCOUNTER
Pt states she has been sick with hoarseness and sob and coughing since Tuesday. She would like an antibiotic called in to [facility] on [address].

## 2018-10-18 NOTE — TELEPHONE ENCOUNTER
Spoke with pt. She is c/o chest congestion, cough, hoarseness since Tuesday. Sputum greenish in color, no fever.  Yuki Guzman

## 2018-12-18 DIAGNOSIS — I10 ESSENTIAL HYPERTENSION: ICD-10-CM

## 2018-12-20 RX ORDER — AMLODIPINE BESYLATE 5 MG/1
TABLET ORAL
Qty: 90 TAB | Refills: 3 | Status: SHIPPED | OUTPATIENT
Start: 2018-12-20 | End: 2019-10-16 | Stop reason: SDUPTHER

## 2019-02-06 ENCOUNTER — TELEPHONE (OUTPATIENT)
Dept: PULMONOLOGY | Age: 59
End: 2019-02-06

## 2019-02-06 NOTE — TELEPHONE ENCOUNTER
Pt c/o cough and chest congestion. Greenish sputum. No fever. Started Monday.  Pt wants antibiotic to [facility]

## 2019-02-06 NOTE — TELEPHONE ENCOUNTER
PT CALLED([phone]). PT COUGHING UP LIGHT GREEN PHLEGM, CHEST BURNS AND CONGESTION. THINKS SHE MAY NEED ANTIBIOTIC SENT TO [facility] [phone]. PLEASE CALL HER BACK.

## 2019-02-11 RX ORDER — AZITHROMYCIN 250 MG/1
TABLET, FILM COATED ORAL
Qty: 6 TAB | Refills: 0 | Status: SHIPPED | OUTPATIENT
Start: 2019-02-11 | End: 2019-07-23 | Stop reason: ALTCHOICE

## 2019-02-25 RX ORDER — FLUTICASONE PROPIONATE AND SALMETEROL XINAFOATE 115; 21 UG/1; UG/1
AEROSOL, METERED RESPIRATORY (INHALATION)
Qty: 12 INHALER | Refills: 4 | Status: SHIPPED | OUTPATIENT
Start: 2019-02-25

## 2019-04-15 RX ORDER — BUPROPION HYDROCHLORIDE 150 MG/1
TABLET, EXTENDED RELEASE ORAL
Qty: 90 TAB | Refills: 1 | Status: SHIPPED | OUTPATIENT
Start: 2019-04-15 | End: 2019-10-16 | Stop reason: SDUPTHER

## 2019-07-23 ENCOUNTER — HOSPITAL ENCOUNTER (OUTPATIENT)
Dept: LAB | Age: 59
Discharge: HOME | End: 2019-07-23
Payer: COMMERCIAL

## 2019-07-23 ENCOUNTER — OFFICE VISIT (OUTPATIENT)
Dept: FAMILY MEDICINE CLINIC | Age: 59
End: 2019-07-23

## 2019-07-23 VITALS
TEMPERATURE: 98 F | DIASTOLIC BLOOD PRESSURE: 66 MMHG | WEIGHT: 152.2 LBS | BODY MASS INDEX: 23.89 KG/M2 | SYSTOLIC BLOOD PRESSURE: 110 MMHG | HEART RATE: 80 BPM | OXYGEN SATURATION: 99 % | RESPIRATION RATE: 17 BRPM | HEIGHT: 67 IN

## 2019-07-23 DIAGNOSIS — J22 ACUTE LOWER RESPIRATORY INFECTION: ICD-10-CM

## 2019-07-23 DIAGNOSIS — M34.1 CREST SYNDROME (HCC): ICD-10-CM

## 2019-07-23 DIAGNOSIS — Z13.1 SCREENING FOR DIABETES MELLITUS: ICD-10-CM

## 2019-07-23 DIAGNOSIS — E78.00 PURE HYPERCHOLESTEROLEMIA: ICD-10-CM

## 2019-07-23 DIAGNOSIS — I10 ESSENTIAL HYPERTENSION: ICD-10-CM

## 2019-07-23 DIAGNOSIS — K21.9 GASTROESOPHAGEAL REFLUX DISEASE, ESOPHAGITIS PRESENCE NOT SPECIFIED: ICD-10-CM

## 2019-07-23 DIAGNOSIS — Z12.39 BREAST CANCER SCREENING: ICD-10-CM

## 2019-07-23 DIAGNOSIS — I73.00 RAYNAUD'S DISEASE WITHOUT GANGRENE: ICD-10-CM

## 2019-07-23 DIAGNOSIS — Z12.72 VAGINAL PAP SMEAR: ICD-10-CM

## 2019-07-23 DIAGNOSIS — M34.9 SYSTEMIC SCLEROSIS (HCC): ICD-10-CM

## 2019-07-23 DIAGNOSIS — Z00.00 WELL WOMAN EXAM (NO GYNECOLOGICAL EXAM): Primary | ICD-10-CM

## 2019-07-23 LAB
ALBUMIN SERPL-MCNC: 3.7 G/DL (ref 3.4–5)
ALBUMIN/GLOB SERPL: 1 {RATIO} (ref 0.8–1.7)
ALP SERPL-CCNC: 59 U/L (ref 45–117)
ALT SERPL-CCNC: 53 U/L (ref 13–56)
ANION GAP SERPL CALC-SCNC: 6 MMOL/L (ref 3–18)
AST SERPL-CCNC: 30 U/L (ref 10–38)
BILIRUB SERPL-MCNC: 0.4 MG/DL (ref 0.2–1)
BUN SERPL-MCNC: 14 MG/DL (ref 7–18)
BUN/CREAT SERPL: 18 (ref 12–20)
CALCIUM SERPL-MCNC: 8.9 MG/DL (ref 8.5–10.1)
CHLORIDE SERPL-SCNC: 106 MMOL/L (ref 100–111)
CHOLEST SERPL-MCNC: 219 MG/DL
CO2 SERPL-SCNC: 28 MMOL/L (ref 21–32)
CREAT SERPL-MCNC: 0.8 MG/DL (ref 0.6–1.3)
ERYTHROCYTE [DISTWIDTH] IN BLOOD BY AUTOMATED COUNT: 13.2 % (ref 11.6–14.5)
GLOBULIN SER CALC-MCNC: 3.7 G/DL (ref 2–4)
GLUCOSE SERPL-MCNC: 85 MG/DL (ref 74–99)
HCT VFR BLD AUTO: 42 % (ref 35–45)
HDLC SERPL-MCNC: 56 MG/DL (ref 40–60)
HDLC SERPL: 3.9 {RATIO} (ref 0–5)
HGB BLD-MCNC: 13.9 G/DL (ref 12–16)
LDLC SERPL CALC-MCNC: 126 MG/DL (ref 0–100)
LIPID PROFILE,FLP: ABNORMAL
MCH RBC QN AUTO: 31.5 PG (ref 24–34)
MCHC RBC AUTO-ENTMCNC: 33.1 G/DL (ref 31–37)
MCV RBC AUTO: 95.2 FL (ref 74–97)
PLATELET # BLD AUTO: 233 K/UL (ref 135–420)
PMV BLD AUTO: 10.9 FL (ref 9.2–11.8)
POTASSIUM SERPL-SCNC: 4.2 MMOL/L (ref 3.5–5.5)
PROT SERPL-MCNC: 7.4 G/DL (ref 6.4–8.2)
RBC # BLD AUTO: 4.41 M/UL (ref 4.2–5.3)
SODIUM SERPL-SCNC: 140 MMOL/L (ref 136–145)
TRIGL SERPL-MCNC: 185 MG/DL (ref ?–150)
VLDLC SERPL CALC-MCNC: 37 MG/DL
WBC # BLD AUTO: 7.3 K/UL (ref 4.6–13.2)

## 2019-07-23 PROCEDURE — 80053 COMPREHEN METABOLIC PANEL: CPT

## 2019-07-23 PROCEDURE — 83036 HEMOGLOBIN GLYCOSYLATED A1C: CPT

## 2019-07-23 PROCEDURE — 88142 CYTOPATH C/V THIN LAYER: CPT

## 2019-07-23 PROCEDURE — 85027 COMPLETE CBC AUTOMATED: CPT

## 2019-07-23 PROCEDURE — 36415 COLL VENOUS BLD VENIPUNCTURE: CPT

## 2019-07-23 PROCEDURE — 80061 LIPID PANEL: CPT

## 2019-07-23 RX ORDER — LANOLIN ALCOHOL/MO/W.PET/CERES
1 CREAM (GRAM) TOPICAL
COMMUNITY

## 2019-07-23 RX ORDER — AZITHROMYCIN 250 MG/1
TABLET, FILM COATED ORAL
Qty: 6 TAB | Refills: 0 | Status: SHIPPED | OUTPATIENT
Start: 2019-07-23 | End: 2019-07-28

## 2019-07-23 NOTE — PROGRESS NOTES
Chief Complaint   Patient presents with    Well Woman     1. Have you been to the ER, urgent care clinic since your last visit? Hospitalized since your last visit? No     2. Have you seen or consulted any other health care providers outside of the [address] [place] [address] since your last visit? Include any pap smears or colon screening. Follow with pulmonary and cardiology   Subjective:   59 y.o. female for Well Woman Check. Her gyne and breast care is done elsewhere by her Ob-Gyne physician. (patient's gyn no longer in practice)  Was referred to [facility] for abnormal vaginal PAP  4-7-2015 with Dr. Chantel Ro/ Last PAP : Vaginal wall Image Processed  Satisfactory for evaluation. Cellular changes consistent with Atrophic Vaginitis. NEGATIVE FOR INTRAEPITHELIAL LESION OR MALIGNANCY.   Overdue for mammography  Patient Active Problem List   Diagnosis Code    GERD (gastroesophageal reflux disease) K21.9    Anxiety F41.9    Basal cell carcinoma C44.91    MVP (mitral valve prolapse) I34.1    HTN (hypertension) I10    HLD (hyperlipidemia) E78.5    Sciatica M54.30    Asthma J45.909    CREST syndrome ([facility] [address] M34.1    Raynaud disease I73.00    Hepatitis K75.9    Vaginal lesion N89.8    H/O recurrent pneumonia Z87.01    Systemic sclerosis ([facility] [address] M34.9     Patient Active Problem List    Diagnosis Date Noted    Systemic sclerosis ([facility] [address] 04/14/2015    H/O recurrent pneumonia 11/05/2013    Vaginal lesion 06/25/2013    CREST syndrome ([facility] [address] 08/02/2012    Raynaud disease 08/02/2012    Hepatitis 08/02/2012    Sciatica 06/06/2012    Asthma 06/06/2012    HLD (hyperlipidemia) 04/04/2011    HTN (hypertension) 03/01/2011    GERD (gastroesophageal reflux disease) 06/29/2010    Anxiety 06/29/2010    Basal cell carcinoma 06/29/2010    MVP (mitral valve prolapse) 06/29/2010     Current Outpatient Medications   Medication Sig Dispense Refill    calcium citrate-vitamin d3 (CITRACAL+D) 315-200 mg-unit tab Take 1 Tab by mouth daily (with breakfast).  buPROPion SR (WELLBUTRIN SR) 150 mg SR tablet TAKE 1 TABLET DAILY 90 Tab 1    amLODIPine (NORVASC) 5 mg tablet TAKE 1 TABLET DAILY 90 Tab 3    fluticasone (FLONASE) 50 mcg/actuation nasal spray 2 Sprays by Both Nostrils route daily as needed for Rhinitis. Administer to right and left nostril. 3 Bottle 3    PROAIR HFA 90 mcg/actuation inhaler INHALE 2 PUFFS EVERY 4 HOURS AS NEEDED FOR WHEEZING 1 Inhaler 0    hydroxychloroquine (PLAQUENIL) 200 mg tablet Take 200 mg by mouth two (2) times a day.  aspirin delayed-release 81 mg tablet Take 81 mg by mouth daily.  ADVAIR -21 mcg/actuation inhaler INHALE 2 PUFFS BY MOUTH TWICE DAILY 12 Inhaler 4    fluticasone-salmeterol (ADVAIR HFA) 115-21 mcg/actuation inhaler Take 2 Puffs by inhalation two (2) times a day. 1 Inhaler 0    loratadine (CLARITIN) 10 mg tablet Take 10 mg by mouth as needed.  MULTIVITS W-FE,OTHER MIN/LUT (CENTRUM SILVER ULTRA WOMEN'S PO) Take  by mouth.  lansoprazole (PREVACID) 30 mg capsule Take 30 mg by mouth daily (before breakfast).        Allergies   Allergen Reactions    Bactrim [Sulfamethoxazole-Trimethoprim] Rash    Effexor [Venlafaxine] Other (comments)     Effect mitral valve prolapse    Paxil [Paroxetine Hcl] Other (comments)      medication would effect patients  mitral valve prolapse    Penicillins Itching    Sulfa (Sulfonamide Antibiotics) Rash    Zoloft [Sertraline] Other (comments)     Effect mitral valve prolapse     Past Medical History:   Diagnosis Date    Abnormal pap     ASCUS +HPV    Asthma 6/6/2012    Cancer (HCC)     skin, basal cell    Contact dermatitis and other eczema, due to unspecified cause     basal cell cancer    ULISES (generalised anxiety disorder)     GERD (gastroesophageal reflux disease) 6/29/2010    Hypercholesterolemia     Step I diet    MVP (mitral valve prolapse)     Pneumonia     4 times    Sciatica 6/6/2012     Past Surgical History:   Procedure Laterality Date    HX COLONOSCOPY  5/13/2015    follow up 5 years    HX GYN      hysterectomy     Family History   Problem Relation Age of Onset    Diabetes Mother     Heart Disease Mother     Cancer Father     Cancer Paternal Grandfather      Social History     Tobacco Use    Smoking status: Never Smoker    Smokeless tobacco: Never Used   Substance Use Topics    Alcohol use: Yes     Alcohol/week: 0.0 standard drinks     Comment: occasional         Review of Systems   Constitutional: Negative. HENT: Negative. Eyes: Negative. Respiratory: Positive for cough and sputum production. Negative for hemoptysis, shortness of breath and wheezing. For several days   History of recurrent pneumonia    Cardiovascular: Negative. Gastrointestinal: Negative. Genitourinary: Negative. Musculoskeletal: Negative. Skin: Negative. Neurological: Negative. Endo/Heme/Allergies: Negative. Psychiatric/Behavioral: Negative. OBJECTIVE:  Awake and alert in no acute distress  HEENT:  Head normocephalic atraumatic, Eyes PERRLA, Ears: TMS bilaterally pearly grey, Nares patent without erythema or edema, Pharynx without erythema. Dentition fair  Neck supple without lymphadenopathy, no carotid artery bruits auscultated bilaterally. Lungs scattered crackles throughout no wheezing, no coughing, no labored respirations   S1 S2 RRR without ectopy or murmur auscultated. Abdomen: normoactive bowel sounds all quadrants, no tenderness to abdomen upper and lower quadrants. No hepatosplenomegaly  Neuro: cranial nerves II-XII tested and intact. No gait abnormalities. Musculoskeletal: normal examination upper and lower extremities head and neck, no warmth to joints, no edema to joints, no gait abnormalities, motor strength +5/5 upper and lower extremities head and neck. DTRs brisk +2 bilaterally.    Integumentary: no rashes  No suspicious skin lesions   Normal skin turgor  Pelvic exam: VULVA: normal appearing vulva with no masses, tenderness or lesions, VAGINA: normal appearing vagina with normal color and discharge, no lesions, CERVIX: surgically absent, UTERUS: surgically absent, vaginal cuff well healed, ADNEXA: unable to palpate ovaries no adnexal tenderness bilaterally, PAP: Pap smear done today, thin-prep method. Breasts: breasts appear normal, no suspicious masses, no skin or nipple changes or axillary nodes, risk and benefit of breast self-exam was discussed. Visit Vitals  /66 (BP 1 Location: Left arm, BP Patient Position: Sitting)   Pulse 80   Temp 98 °F (36.7 °C) (Oral)   Resp 17   Ht 5' 7\" (1.702 m)   Wt 152 lb 3.2 oz (69 kg)   SpO2 99%   BMI 23.84 kg/m²     Diagnoses and all orders for this visit:    Well woman exam (no gynecological exam)  Comments:  [V70.0]    Essential hypertension  -     LIPID PANEL; Future  -     CBC W/O DIFF; Future  -     METABOLIC PANEL, COMPREHENSIVE; Future    Pure hypercholesterolemia cardiology   -     LIPID PANEL; Future    CREST syndrome [facility]) cardiology     Gastroesophageal reflux disease, esophagitis presence not specified Stable continue current treatment plan    Raynaud's disease without gangrene rheumatology     Systemic sclerosis [facility]) dermatology     Screening for diabetes mellitus  -     HEMOGLOBIN A1C W/O EAG; Future    Vaginal Pap smear  -     PAP, LIQUID BASED, MANUAL SCREEN; Future    Acute lower respiratory infection  -     azithromycin (ZITHROMAX) 250 mg tablet; Take 2 tablets today, then take 1 tablet daily, Normal, Disp-6 Tab, R-0    Breast cancer screening  -     [facility] MAMMO BI SCREENING INCL CAD; Future    Anticipatory guidance regarding health promotion for this age range and patient verbalizes understanding. Patient agrees with plan and verbalizes understanding. I have discussed the diagnosis with the patient and the intended plan as seen in the above orders.   The patient has received an after-visit summary and questions were answered concerning future plans. I have discussed medication side effects and warnings with the patient as well. Follow-up and Dispositions    · Return in about 1 year (around 7/23/2020) for wwe.

## 2019-07-23 NOTE — PATIENT INSTRUCTIONS
Well Visit, Women 48 to 72: Care Instructions  Your Care Instructions    Physical exams can help you stay healthy. Your doctor has checked your overall health and may have suggested ways to take good care of yourself. He or she also may have recommended tests. At home, you can help prevent illness with healthy eating, regular exercise, and other steps. Follow-up care is a key part of your treatment and safety. Be sure to make and go to all appointments, and call your doctor if you are having problems. It's also a good idea to know your test results and keep a list of the medicines you take. How can you care for yourself at home? · Reach and stay at a healthy weight. This will lower your risk for many problems, such as obesity, diabetes, heart disease, and high blood pressure. · Get at least 30 minutes of exercise on most days of the week. Walking is a good choice. You also may want to do other activities, such as running, swimming, cycling, or playing tennis or team sports. · Do not smoke. Smoking can make health problems worse. If you need help quitting, talk to your doctor about stop-smoking programs and medicines. These can increase your chances of quitting for good. · Protect your skin from too much sun. When you're outdoors from 10 a.m. to 4 p.m., stay in the shade or cover up with clothing and a hat with a wide brim. Wear sunglasses that block UV rays. Even when it's cloudy, put broad-spectrum sunscreen (SPF 30 or higher) on any exposed skin. · See a dentist one or two times a year for checkups and to have your teeth cleaned. · Wear a seat belt in the car. Follow your doctor's advice about when to have certain tests. These tests can spot problems early. · Cholesterol. Your doctor will tell you how often to have this done based on your age, family history, or other things that can increase your risk for heart attack and stroke. · Blood pressure.  Have your blood pressure checked during a routine doctor visit. Your doctor will tell you how often to check your blood pressure based on your age, your blood pressure results, and other factors. · Mammogram. Ask your doctor how often you should have a mammogram, which is an X-ray of your breasts. A mammogram can spot breast cancer before it can be felt and when it is easiest to treat. · Pap test and pelvic exam. Ask your doctor how often you should have a Pap test. You may not need to have a Pap test as often as you used to. · Vision. Have your eyes checked every year or two or as often as your doctor suggests. Some experts recommend that you have yearly exams for glaucoma and other age-related eye problems starting at age 48. · Hearing. Tell your doctor if you notice any change in your hearing. You can have tests to find out how well you hear. · Diabetes. Ask your doctor whether you should have tests for diabetes. · Colorectal cancer. Your risk for colorectal cancer gets higher as you get older. Some experts say that adults should start regular screening at age 48 and stop at age 76. Others say to start before age 48 or continue after age 76. Talk with your doctor about your risk and when to start and stop screening. · Thyroid disease. Talk to your doctor about whether to have your thyroid checked as part of a regular physical exam. Women have an increased chance of a thyroid problem. · Osteoporosis. You should begin tests for bone density at age 72. If you are younger than 72, ask your doctor whether you have factors that may increase your risk for this disease. You may want to have this test before age 72. · Heart attack and stroke risk. At least every 4 to 6 years, you should have your risk for heart attack and stroke assessed. Your doctor uses factors such as your age, blood pressure, cholesterol, and whether you smoke or have diabetes to show what your risk for a heart attack or stroke is over the next 10 years.   When should you call for help?  Watch closely for changes in your health, and be sure to contact your doctor if you have any problems or symptoms that concern you. Where can you learn more? Go to http://kenneth-ramon.info/. Enter L536 in the search box to learn more about \"Well Visit, Women 50 to 72: Care Instructions. \"  Current as of: December 13, 2018  Content Version: 12.1  © 6508-5169 [employer], [employer]. Care instructions adapted under license by [employer] (which disclaims liability or warranty for this information). If you have questions about a medical condition or this instruction, always ask your healthcare professional. [address] any warranty or liability for your use of this information.

## 2019-07-24 LAB — HBA1C MFR BLD: 5.4 % (ref 4.2–5.6)

## 2019-08-22 DIAGNOSIS — N95.2 VAGINAL ATROPHY: Primary | ICD-10-CM

## 2019-08-22 RX ORDER — ESTRADIOL 0.1 MG/G
CREAM VAGINAL
Qty: 42.5 G | Refills: 3 | Status: SHIPPED | OUTPATIENT
Start: 2019-08-22 | End: 2020-01-13 | Stop reason: SDUPTHER

## 2019-08-27 ENCOUNTER — TELEPHONE (OUTPATIENT)
Dept: FAMILY MEDICINE CLINIC | Age: 59
End: 2019-08-27

## 2019-08-27 NOTE — TELEPHONE ENCOUNTER
Patient came in and dropped off insurance discount form to be filled out. Form will be given to nurse.

## 2019-10-16 DIAGNOSIS — I10 ESSENTIAL HYPERTENSION: ICD-10-CM

## 2019-10-17 ENCOUNTER — HOSPITAL ENCOUNTER (OUTPATIENT)
Dept: MAMMOGRAPHY | Age: 59
Discharge: HOME | End: 2019-10-17
Attending: NURSE PRACTITIONER
Payer: COMMERCIAL

## 2019-10-17 DIAGNOSIS — Z12.39 BREAST CANCER SCREENING: ICD-10-CM

## 2019-10-17 PROCEDURE — 77063 BREAST TOMOSYNTHESIS BI: CPT

## 2019-10-17 RX ORDER — BUPROPION HYDROCHLORIDE 150 MG/1
TABLET, EXTENDED RELEASE ORAL
Qty: 90 TAB | Refills: 1 | Status: SHIPPED | OUTPATIENT
Start: 2019-10-17 | End: 2020-04-08

## 2019-10-17 RX ORDER — AMLODIPINE BESYLATE 5 MG/1
TABLET ORAL
Qty: 90 TAB | Refills: 3 | Status: SHIPPED | OUTPATIENT
Start: 2019-10-17 | End: 2020-08-21 | Stop reason: SDUPTHER

## 2020-01-13 DIAGNOSIS — N95.2 VAGINAL ATROPHY: ICD-10-CM

## 2020-01-14 RX ORDER — ESTRADIOL 0.1 MG/G
CREAM VAGINAL
Qty: 42.5 G | Refills: 3 | Status: SHIPPED | OUTPATIENT
Start: 2020-01-14 | End: 2020-02-20 | Stop reason: SDUPTHER

## 2020-02-20 DIAGNOSIS — N95.2 VAGINAL ATROPHY: ICD-10-CM

## 2020-02-20 RX ORDER — ESTRADIOL 0.1 MG/G
CREAM VAGINAL
Qty: 42.5 G | Refills: 3 | Status: SHIPPED | OUTPATIENT
Start: 2020-02-20 | End: 2020-11-04 | Stop reason: ALTCHOICE

## 2020-02-20 NOTE — TELEPHONE ENCOUNTER
Previous script was sent to a local [facility]. Yaya Diaz is now requesting a new script.     Last Visit: 7/23/19 with ROXANN Skaggs  Next Appointment: return in 1 year  Previous Refill Encounter(s): 1/14/20 #42.5 with 3 refills    Requested Prescriptions     Pending Prescriptions Disp Refills    estradioL (ESTRACE) 0.01 % (0.1 mg/gram) vaginal cream 42.5 g 3     Sig: Insert intravaginally twice weekly

## 2020-04-08 RX ORDER — BUPROPION HYDROCHLORIDE 150 MG/1
150 TABLET, EXTENDED RELEASE ORAL DAILY
Qty: 90 TAB | Refills: 1 | Status: SHIPPED | OUTPATIENT
Start: 2020-04-08 | End: 2020-08-21 | Stop reason: SDUPTHER

## 2020-04-08 NOTE — TELEPHONE ENCOUNTER
Last Visit: 7/23/19 with ROXANN Skaggs  Next Appointment: f/u in 1 year  Previous Refill Encounter(s): 10/17/19 #90 with 1 refill    Requested Prescriptions     Pending Prescriptions Disp Refills    buPROPion SR (WELLBUTRIN SR) 150 mg SR tablet [Pharmacy Med Name: BUPROP 12 SR TAB 150MG(W)] 90 Tab 1     Sig: Take 1 Tab by mouth daily.

## 2020-08-21 ENCOUNTER — TELEPHONE (OUTPATIENT)
Dept: FAMILY MEDICINE CLINIC | Age: 60
End: 2020-08-21

## 2020-08-21 DIAGNOSIS — I10 ESSENTIAL HYPERTENSION: ICD-10-CM

## 2020-08-21 RX ORDER — BUPROPION HYDROCHLORIDE 150 MG/1
150 TABLET, EXTENDED RELEASE ORAL DAILY
Qty: 90 TAB | Refills: 0 | Status: SHIPPED | OUTPATIENT
Start: 2020-08-21 | End: 2020-11-23 | Stop reason: SDUPTHER

## 2020-08-21 RX ORDER — AMLODIPINE BESYLATE 5 MG/1
5 TABLET ORAL DAILY
Qty: 90 TAB | Refills: 0 | Status: SHIPPED | OUTPATIENT
Start: 2020-08-21 | End: 2020-10-07

## 2020-08-21 NOTE — TELEPHONE ENCOUNTER
New mailorder, [facility], is requesting refills. Previous Rx's were sent to [facility]. Last Visit: 7/23/19 with ROXANN Skaggs  Next Appointment: Angle Mccarthy to follow-up in 1 year  Previous Refill Encounter(s): 4/8/20 Wellbutrin #90 with 1 refill, 10/17/19 Norvasc #90 with 3 refills    Requested Prescriptions     Pending Prescriptions Disp Refills    amLODIPine (NORVASC) 5 mg tablet 90 Tab 0     Sig: Take 1 Tab by mouth daily.  buPROPion SR (WELLBUTRIN SR) 150 mg SR tablet 90 Tab 0     Sig: Take 1 Tab by mouth daily.

## 2020-10-06 DIAGNOSIS — I10 ESSENTIAL HYPERTENSION: ICD-10-CM

## 2020-10-07 RX ORDER — AMLODIPINE BESYLATE 5 MG/1
TABLET ORAL
Qty: 90 TAB | Refills: 3 | Status: SHIPPED | OUTPATIENT
Start: 2020-10-07 | End: 2021-08-19

## 2020-10-22 ENCOUNTER — HOSPITAL ENCOUNTER (OUTPATIENT)
Dept: LAB | Age: 60
Discharge: HOME | End: 2020-10-22
Payer: COMMERCIAL

## 2020-10-22 ENCOUNTER — OFFICE VISIT (OUTPATIENT)
Dept: FAMILY MEDICINE CLINIC | Age: 60
End: 2020-10-22
Payer: COMMERCIAL

## 2020-10-22 VITALS
DIASTOLIC BLOOD PRESSURE: 68 MMHG | OXYGEN SATURATION: 97 % | BODY MASS INDEX: 25.15 KG/M2 | TEMPERATURE: 97.3 F | WEIGHT: 160.2 LBS | HEIGHT: 67 IN | RESPIRATION RATE: 18 BRPM | HEART RATE: 81 BPM | SYSTOLIC BLOOD PRESSURE: 118 MMHG

## 2020-10-22 DIAGNOSIS — Z12.31 ENCOUNTER FOR SCREENING MAMMOGRAM FOR MALIGNANT NEOPLASM OF BREAST: ICD-10-CM

## 2020-10-22 DIAGNOSIS — Z01.419 WELL WOMAN EXAM WITH ROUTINE GYNECOLOGICAL EXAM: ICD-10-CM

## 2020-10-22 DIAGNOSIS — Z12.72 SPECIAL SCREENING FOR MALIGNANT NEOPLASMS, VAGINA: ICD-10-CM

## 2020-10-22 DIAGNOSIS — Z23 NEEDS FLU SHOT: Primary | ICD-10-CM

## 2020-10-22 PROCEDURE — 99386 PREV VISIT NEW AGE 40-64: CPT | Performed by: NURSE PRACTITIONER

## 2020-10-22 PROCEDURE — 88142 CYTOPATH C/V THIN LAYER: CPT

## 2020-10-22 PROCEDURE — 90686 IIV4 VACC NO PRSV 0.5 ML IM: CPT | Performed by: NURSE PRACTITIONER

## 2020-10-22 PROCEDURE — 90471 IMMUNIZATION ADMIN: CPT | Performed by: NURSE PRACTITIONER

## 2020-10-22 NOTE — PROGRESS NOTES
Subjective:   56 y.o. female for [employer] Woman Check. Patient's last menstrual period was 06/01/1992. Social History: single partner, contraception - status post hysterectomy. Pertinent past medical hstory: no history of HTN, DVT, CAD, DM, liver disease, migraines or smoking. Patient Active Problem List   Diagnosis Code    GERD (gastroesophageal reflux disease) K21.9    Anxiety F41.9    Basal cell carcinoma C44.91    MVP (mitral valve prolapse) I34.1    HTN (hypertension) I10    HLD (hyperlipidemia) E78.5    Sciatica M54.30    Asthma J45.909    CREST syndrome (HCC) M34.1    Raynaud disease I73.00    Hepatitis K75.9    Vaginal lesion N89.8    H/O recurrent pneumonia Z87.01    Systemic sclerosis (HCC) M34.9     Current Outpatient Medications   Medication Sig Dispense Refill    amLODIPine (NORVASC) 5 mg tablet TAKE 1 TABLET BY MOUTH  DAILY 90 Tab 3    buPROPion SR (WELLBUTRIN SR) 150 mg SR tablet Take 1 Tab by mouth daily. 90 Tab 0    loratadine (CLARITIN) 10 mg tablet Take 10 mg by mouth as needed.  PROAIR HFA 90 mcg/actuation inhaler INHALE 2 PUFFS EVERY 4 HOURS AS NEEDED FOR WHEEZING 1 Inhaler 0    lansoprazole (PREVACID) 30 mg capsule Take 30 mg by mouth daily (before breakfast).  hydroxychloroquine (PLAQUENIL) 200 mg tablet Take 200 mg by mouth two (2) times a day.  aspirin delayed-release 81 mg tablet Take 81 mg by mouth daily.  estradioL (ESTRACE) 0.01 % (0.1 mg/gram) vaginal cream Insert intravaginally twice weekly 42.5 g 3    calcium citrate-vitamin d3 (CITRACAL+D) 315-200 mg-unit tab Take 1 Tab by mouth daily (with breakfast).  ADVAIR -21 mcg/actuation inhaler INHALE 2 PUFFS BY MOUTH TWICE DAILY 12 Inhaler 4    fluticasone-salmeterol (ADVAIR HFA) 115-21 mcg/actuation inhaler Take 2 Puffs by inhalation two (2) times a day. 1 Inhaler 0    MULTIVITS W-FE,OTHER MIN/LUT (CENTRUM SILVER ULTRA WOMEN'S PO) Take  by mouth.       fluticasone (FLONASE) 50 mcg/actuation nasal spray 2 Sprays by Both Nostrils route daily as needed for Rhinitis. Administer to right and left nostril. 3 Bottle 3     Allergies   Allergen Reactions    Bactrim [Sulfamethoxazole-Trimethoprim] Rash    Effexor [Venlafaxine] Other (comments)     Effect mitral valve prolapse    Paxil [Paroxetine Hcl] Other (comments)      medication would effect patients  mitral valve prolapse    Penicillins Itching    Sulfa (Sulfonamide Antibiotics) Rash    Zoloft [Sertraline] Other (comments)     Effect mitral valve prolapse        ROS:  Feeling well. No dyspnea or chest pain on exertion. No abdominal pain, change in bowel habits, black or bloody stools. No urinary tract symptoms. GYN ROS: S/P hysterectomy, pelvic pain or discharge, no breast pain or new or enlarging lumps on self exam. No neurological complaints. Objective:     Visit Vitals  /68   Pulse 81   Temp 97.3 °F (36.3 °C) (Temporal)   Resp 18   Ht 5' 7\" (1.702 m)   Wt 160 lb 3.2 oz (72.7 kg)   LMP 06/01/1992   SpO2 97%   BMI 25.09 kg/m²     The patient appears well, alert, oriented x 3, in no distress. ENT normal.  Neck supple. No adenopathy or thyromegaly. DAVID. Lungs are clear, good air entry, no wheezes, rhonchi or rales. S1 and S2 normal, no murmurs, regular rate and rhythm. Abdomen soft without tenderness, guarding, mass or organomegaly. Extremities show no edema, normal peripheral pulses. Neurological is normal, no focal findings. BREAST EXAM: breasts appear normal, no suspicious masses, no skin or nipple changes or axillary nodes    PELVIC EXAM: VULVA: normal appearing vulva with no masses, tenderness or lesions, VAGINA: normal appearing vagina with normal color and discharge, no lesions, PAP: Pap smear done today, Vaginal specimen taken due to hysterectomy and hx of suspicion of vaginal ca. Assessment/Plan:   well woman  mammogram  pap smear  return annually or prn    ICD-10-CM ICD-9-CM    1.  Needs flu shot  Z23 V04.81 INFLUENZA VIRUS VAC QUAD,SPLIT,PRESV FREE SYRINGE IM   2. Well woman exam with routine gynecological exam  Z01.419 V72.31 CBC WITH AUTOMATED DIFF      METABOLIC PANEL, COMPREHENSIVE      LIPID PANEL      VITAMIN D, 25 HYDROXY      PAP, LB, RFX HPV [id number])      CANCELED: PAP, LB, RFX HPV [id number])    [V72.31]   3. Encounter for screening mammogram for malignant neoplasm of breast  Z12.31 V76.12 HUGH MAMMO BI SCREENING INCL CAD   4. Special screening for malignant neoplasms, vagina  Z12.72 V76.47      Follow-up and Dispositions    · Return in about 1 year (around 10/22/2021) for Well Woman.        Micah Dance, NP

## 2020-10-22 NOTE — PATIENT INSTRUCTIONS
Well Visit, Women 48 to 72: Care Instructions Your Care Instructions Physical exams can help you stay healthy. Your doctor has checked your overall health and may have suggested ways to take good care of yourself. He or she also may have recommended tests. At home, you can help prevent illness with healthy eating, regular exercise, and other steps. Follow-up care is a key part of your treatment and safety. Be sure to make and go to all appointments, and call your doctor if you are having problems. It's also a good idea to know your test results and keep a list of the medicines you take. How can you care for yourself at home? · Reach and stay at a healthy weight. This will lower your risk for many problems, such as obesity, diabetes, heart disease, and high blood pressure. · Get at least 30 minutes of exercise on most days of the week. Walking is a good choice. You also may want to do other activities, such as running, swimming, cycling, or playing tennis or team sports. · Do not smoke. Smoking can make health problems worse. If you need help quitting, talk to your doctor about stop-smoking programs and medicines. These can increase your chances of quitting for good. · Protect your skin from too much sun. When you're outdoors from 10 a.m. to 4 p.m., stay in the shade or cover up with clothing and a hat with a wide brim. Wear sunglasses that block UV rays. Even when it's cloudy, put broad-spectrum sunscreen (SPF 30 or higher) on any exposed skin. · See a dentist one or two times a year for checkups and to have your teeth cleaned. · Wear a seat belt in the car. Follow your doctor's advice about when to have certain tests. These tests can spot problems early. · Cholesterol. Your doctor will tell you how often to have this done based on your age, family history, or other things that can increase your risk for heart attack and stroke. · Blood pressure. Have your blood pressure checked during a routine doctor visit. Your doctor will tell you how often to check your blood pressure based on your age, your blood pressure results, and other factors. · Mammogram. Ask your doctor how often you should have a mammogram, which is an X-ray of your breasts. A mammogram can spot breast cancer before it can be felt and when it is easiest to treat. · Pap test and pelvic exam. Ask your doctor how often you should have a Pap test. You may not need to have a Pap test as often as you used to. · Vision. Have your eyes checked every year or two or as often as your doctor suggests. Some experts recommend that you have yearly exams for glaucoma and other age-related eye problems starting at age 48. · Hearing. Tell your doctor if you notice any change in your hearing. You can have tests to find out how well you hear. · Diabetes. Ask your doctor whether you should have tests for diabetes. · Colorectal cancer. Your risk for colorectal cancer gets higher as you get older. Some experts say that adults should start regular screening at age 48 and stop at age 76. Others say to start before age 48 or continue after age 76. Talk with your doctor about your risk and when to start and stop screening. · Thyroid disease. Talk to your doctor about whether to have your thyroid checked as part of a regular physical exam. Women have an increased chance of a thyroid problem. · Osteoporosis. You should begin tests for bone density at age 72. If you are younger than 72, ask your doctor whether you have factors that may increase your risk for this disease. You may want to have this test before age 72. · Heart attack and stroke risk. At least every 4 to 6 years, you should have your risk for heart attack and stroke assessed.  Your doctor uses factors such as your age, blood pressure, cholesterol, and whether you smoke or have diabetes to show what your risk for a heart attack or stroke is over the next 10 years. When should you call for help? Watch closely for changes in your health, and be sure to contact your doctor if you have any problems or symptoms that concern you. Where can you learn more? Go to http://www.gray.com/ Enter N299 in the search box to learn more about \"Well Visit, Women 50 to 72: Care Instructions. \" Current as of: May 27, 2020               Content Version: 12.6 © 6470-9308 [employer]. Care instructions adapted under license by [employer] (which disclaims liability or warranty for this information). If you have questions about a medical condition or this instruction, always ask your healthcare professional. [address] any warranty or liability for your use of this information.

## 2020-10-22 NOTE — PROGRESS NOTES
Patricia Fung presents today for   Chief Complaint   Patient presents with    Well Woman       Is someone accompanying this pt? no    Is the patient using any DME equipment during OV? no    Depression Screening:  3 most recent PHQ Screens 10/22/2020   Little interest or pleasure in doing things Not at all   Feeling down, depressed, irritable, or hopeless Not at all   Total Score PHQ 2 0       Learning Assessment:  Learning Assessment 11/1/2017   PRIMARY LEARNER Patient   HIGHEST LEVEL OF EDUCATION - PRIMARY LEARNER  2 YEARS OF COLLEGE   PRIMARY LANGUAGE ENGLISH   LEARNER PREFERENCE PRIMARY READING     LISTENING     DEMONSTRATION   ANSWERED BY patient Jannet Delgado     bsps   RELATIONSHIP SELF       Abuse Screening:  No flowsheet data found. Fall Risk  No flowsheet data found. ADL  No flowsheet data found. Health Maintenance reviewed and discussed and ordered per Provider. Health Maintenance Due   Topic Date Due    Shingrix Vaccine Age 49> (1 of 2) 09/18/2008    Flu Vaccine (1) 09/01/2020   . Coordination of Care:  1. Have you been to the ER, urgent care clinic since your last visit? Hospitalized since your last visit? no    2. Have you seen or consulted any other health care providers outside of the [address] [place] [address] since your last visit? Include any pap smears or colon screening. tang Fung is a 56 y.o. female who presents for routine immunizations. She denies any symptoms , reactions or allergies that would exclude them from being immunized today. Risks and adverse reactions were discussed and the VIS was given to them. All questions were addressed. She was observed for 15 min post injection. There were no reactions observed. Verbal order for FLU IM received per Jung Casanova for pt Patricia Fung. Order written down and read back to provider for verification prior to completion.

## 2020-11-04 ENCOUNTER — VIRTUAL VISIT (OUTPATIENT)
Dept: FAMILY MEDICINE CLINIC | Age: 60
End: 2020-11-04
Payer: COMMERCIAL

## 2020-11-04 DIAGNOSIS — N95.2 VAGINAL ATROPHY: Primary | ICD-10-CM

## 2020-11-04 PROCEDURE — 99213 OFFICE O/P EST LOW 20 MIN: CPT | Performed by: NURSE PRACTITIONER

## 2020-11-04 NOTE — PROGRESS NOTES
Carrington Newell is a 56 y.o. female who was seen by synchronous (real-time) audio-video technology on 11/4/2020 for No chief complaint on file. Pt is c/o continued vaginal dryness. She has tried OTC Replens but this has not helped. She has also tried estradiol cream twice a week but states that she is still having dryness, dyspareunia, and light spotting after sex. Pt would like a referral to another pulmonologist closer to her home in [place] [address]. She did not mess well with the current provider and also doesn't want to travel as far. Assessment & Plan:     Diagnoses and all orders for this visit:    1. Vaginal atrophy  -     estradioL (ESTRING) 2 mg (7.5 mcg /24 hour) vaginal ring; Insert 1 Each into vagina now for 1 dose. follow package directions  Indications: vaginal inflammation due to loss of hormone stimulation      Follow-up and Dispositions    · Return in about 6 weeks (around 12/16/2020). 12  Subjective:       Prior to Admission medications    Medication Sig Start Date End Date Taking? Authorizing Provider   amLODIPine (NORVASC) 5 mg tablet TAKE 1 TABLET BY MOUTH  DAILY 10/7/20  Yes Isidoro Jolley MD   buPROPion SR [facility] SR) 150 mg SR tablet Take 1 Tab by mouth daily. 8/21/20  Yes Isidoro Jolley MD   loratadine (CLARITIN) 10 mg tablet Take 10 mg by mouth as needed. Yes Provider, Historical   fluticasone (FLONASE) 50 mcg/actuation nasal spray 2 Sprays by Both Nostrils route daily as needed for Rhinitis. Administer to right and left nostril. 4/14/15  Yes Blair Skaggs NP   lansoprazole (PREVACID) 30 mg capsule Take 30 mg by mouth daily (before breakfast). Yes Provider, Historical   hydroxychloroquine (PLAQUENIL) 200 mg tablet Take 200 mg by mouth two (2) times a day. 6/29/10  Yes Savannah Ravi MD   calcium citrate-vitamin d3 (CITRACAL+D) 315-200 mg-unit tab Take 1 Tab by mouth daily (with breakfast).     Provider, Historical   ADVAIR -12 mcg/actuation inhaler INHALE 2 PUFFS BY MOUTH TWICE DAILY 2/25/19   Vipul Peterson MD   MULTIVITS W-FE,OTHER MIN/LUT (CENTRUM SILVER ULTRA WOMEN'S PO) Take  by mouth. Provider, Historical   PROAIR HFA 90 mcg/actuation inhaler INHALE 2 PUFFS EVERY 4 HOURS AS NEEDED FOR WHEEZING 2/23/15   Kaylie Skaggs NP   aspirin delayed-release 81 mg tablet Take 81 mg by mouth daily. Provider, Historical     Patient Active Problem List   Diagnosis Code    GERD (gastroesophageal reflux disease) K21.9    Anxiety F41.9    Basal cell carcinoma C44.91    MVP (mitral valve prolapse) I34.1    HTN (hypertension) I10    HLD (hyperlipidemia) E78.5    Sciatica M54.30    Asthma J45.909    CREST syndrome (HCC) M34.1    Raynaud disease I73.00    Hepatitis K75.9    Vaginal lesion N89.8    H/O recurrent pneumonia Z87.01    Systemic sclerosis (HCC) M34.9     Current Outpatient Medications   Medication Sig Dispense Refill    amLODIPine (NORVASC) 5 mg tablet TAKE 1 TABLET BY MOUTH  DAILY 90 Tab 3    buPROPion SR (WELLBUTRIN SR) 150 mg SR tablet Take 1 Tab by mouth daily. 90 Tab 0    loratadine (CLARITIN) 10 mg tablet Take 10 mg by mouth as needed.  fluticasone (FLONASE) 50 mcg/actuation nasal spray 2 Sprays by Both Nostrils route daily as needed for Rhinitis. Administer to right and left nostril. 3 Bottle 3    lansoprazole (PREVACID) 30 mg capsule Take 30 mg by mouth daily (before breakfast).  hydroxychloroquine (PLAQUENIL) 200 mg tablet Take 200 mg by mouth two (2) times a day.  calcium citrate-vitamin d3 (CITRACAL+D) 315-200 mg-unit tab Take 1 Tab by mouth daily (with breakfast).  ADVAIR -21 mcg/actuation inhaler INHALE 2 PUFFS BY MOUTH TWICE DAILY 12 Inhaler 4    MULTIVITS W-FE,OTHER MIN/LUT (CENTRUM SILVER ULTRA WOMEN'S PO) Take  by mouth.       PROAIR HFA 90 mcg/actuation inhaler INHALE 2 PUFFS EVERY 4 HOURS AS NEEDED FOR WHEEZING 1 Inhaler 0    aspirin delayed-release 81 mg tablet Take 81 mg by mouth daily. Allergies   Allergen Reactions    Bactrim [Sulfamethoxazole-Trimethoprim] Rash    Effexor [Venlafaxine] Other (comments)     Effect mitral valve prolapse    Paxil [Paroxetine Hcl] Other (comments)      medication would effect patients  mitral valve prolapse    Penicillins Itching    Sulfa (Sulfonamide Antibiotics) Rash    Zoloft [Sertraline] Other (comments)     Effect mitral valve prolapse       ROS    Objective:   No flowsheet data found. General: alert, cooperative, no distress   Mental  status: normal mood, behavior, speech, dress, motor activity, and thought processes, able to follow commands   HENT: NCAT   Neck: no visualized mass   Resp: no respiratory distress   Neuro: no gross deficits   Skin: no discoloration or lesions of concern on visible areas   Psychiatric: normal affect, consistent with stated mood, no evidence of hallucinations     Additional exam findings: N/A      We discussed the expected course, resolution and complications of the diagnosis(es) in detail. Medication risks, benefits, costs, interactions, and alternatives were discussed as indicated. I advised her to contact the office if her condition worsens, changes or fails to improve as anticipated. She expressed understanding with the diagnosis(es) and plan. Karan Jeff, who was evaluated through a patient-initiated, synchronous (real-time) audio-video encounter, and/or her healthcare decision maker, is aware that it is a billable service, with coverage as determined by her insurance carrier. She provided verbal consent to proceed: Yes, and patient identification was verified. It was conducted pursuant to the emergency declaration under the [facility], [address] [place] [address] and the [employer] and [employer] Act. A caregiver was present when appropriate. Ability to conduct physical exam was limited.  I was in the office. The patient was at home.       Mary Easton NP

## 2020-11-27 RX ORDER — BUPROPION HYDROCHLORIDE 150 MG/1
150 TABLET, EXTENDED RELEASE ORAL DAILY
Qty: 90 TAB | Refills: 0 | Status: SHIPPED | OUTPATIENT
Start: 2020-11-27 | End: 2021-01-22

## 2020-12-02 NOTE — TELEPHONE ENCOUNTER
Donato Yost Bolanos: [id number] - PA Case ID: [id number] Need help?  Call us at [phone]   Status   Sent to [facility]   DrugbuPROPion HCl ER (SR) 150MG OR TB12   Form[employer]umRx Electronic Prior Authorization Form (2017 NCPDP)

## 2020-12-11 ENCOUNTER — APPOINTMENT (OUTPATIENT)
Dept: FAMILY MEDICINE CLINIC | Age: 60
End: 2020-12-11

## 2020-12-11 ENCOUNTER — HOSPITAL ENCOUNTER (OUTPATIENT)
Dept: LAB | Age: 60
Discharge: HOME | End: 2020-12-11
Payer: COMMERCIAL

## 2020-12-11 DIAGNOSIS — Z01.419 WELL WOMAN EXAM WITH ROUTINE GYNECOLOGICAL EXAM: ICD-10-CM

## 2020-12-11 LAB
25(OH)D3 SERPL-MCNC: 22 NG/ML (ref 30–100)
ALBUMIN SERPL-MCNC: 3.7 G/DL (ref 3.4–5)
ALBUMIN/GLOB SERPL: 0.9 {RATIO} (ref 0.8–1.7)
ALP SERPL-CCNC: 57 U/L (ref 45–117)
ALT SERPL-CCNC: 60 U/L (ref 13–56)
ANION GAP SERPL CALC-SCNC: 3 MMOL/L (ref 3–18)
AST SERPL-CCNC: 30 U/L (ref 10–38)
BASOPHILS # BLD: 0.1 K/UL (ref 0–0.1)
BASOPHILS NFR BLD: 2 % (ref 0–2)
BILIRUB SERPL-MCNC: 0.3 MG/DL (ref 0.2–1)
BUN SERPL-MCNC: 19 MG/DL (ref 7–18)
BUN/CREAT SERPL: 25 (ref 12–20)
CALCIUM SERPL-MCNC: 9.7 MG/DL (ref 8.5–10.1)
CHLORIDE SERPL-SCNC: 107 MMOL/L (ref 100–111)
CHOLEST SERPL-MCNC: 201 MG/DL
CO2 SERPL-SCNC: 31 MMOL/L (ref 21–32)
CREAT SERPL-MCNC: 0.76 MG/DL (ref 0.6–1.3)
DIFFERENTIAL METHOD BLD: ABNORMAL
EOSINOPHIL # BLD: 0.4 K/UL (ref 0–0.4)
EOSINOPHIL NFR BLD: 9 % (ref 0–5)
ERYTHROCYTE [DISTWIDTH] IN BLOOD BY AUTOMATED COUNT: 12.7 % (ref 11.6–14.5)
GLOBULIN SER CALC-MCNC: 3.9 G/DL (ref 2–4)
GLUCOSE SERPL-MCNC: 86 MG/DL (ref 74–99)
HCT VFR BLD AUTO: 43.7 % (ref 35–45)
HDLC SERPL-MCNC: 51 MG/DL (ref 40–60)
HDLC SERPL: 3.9 {RATIO} (ref 0–5)
HGB BLD-MCNC: 14.1 G/DL (ref 12–16)
LDLC SERPL CALC-MCNC: 129.6 MG/DL (ref 0–100)
LIPID PROFILE,FLP: ABNORMAL
LYMPHOCYTES # BLD: 1.5 K/UL (ref 0.9–3.6)
LYMPHOCYTES NFR BLD: 31 % (ref 21–52)
MCH RBC QN AUTO: 31 PG (ref 24–34)
MCHC RBC AUTO-ENTMCNC: 32.3 G/DL (ref 31–37)
MCV RBC AUTO: 96 FL (ref 74–97)
MONOCYTES # BLD: 0.5 K/UL (ref 0.05–1.2)
MONOCYTES NFR BLD: 10 % (ref 3–10)
NEUTS SEG # BLD: 2.3 K/UL (ref 1.8–8)
NEUTS SEG NFR BLD: 48 % (ref 40–73)
PLATELET # BLD AUTO: 227 K/UL (ref 135–420)
PMV BLD AUTO: 12.2 FL (ref 9.2–11.8)
POTASSIUM SERPL-SCNC: 4.8 MMOL/L (ref 3.5–5.5)
PROT SERPL-MCNC: 7.6 G/DL (ref 6.4–8.2)
RBC # BLD AUTO: 4.55 M/UL (ref 4.2–5.3)
SODIUM SERPL-SCNC: 141 MMOL/L (ref 136–145)
TRIGL SERPL-MCNC: 102 MG/DL (ref ?–150)
VLDLC SERPL CALC-MCNC: 20.4 MG/DL
WBC # BLD AUTO: 4.9 K/UL (ref 4.6–13.2)

## 2020-12-11 PROCEDURE — 85025 COMPLETE CBC W/AUTO DIFF WBC: CPT

## 2020-12-11 PROCEDURE — 80061 LIPID PANEL: CPT

## 2020-12-11 PROCEDURE — 80053 COMPREHEN METABOLIC PANEL: CPT

## 2020-12-11 PROCEDURE — 36415 COLL VENOUS BLD VENIPUNCTURE: CPT

## 2020-12-11 PROCEDURE — 82306 VITAMIN D 25 HYDROXY: CPT

## 2021-01-01 NOTE — TELEPHONE ENCOUNTER
PA completed via phone  Waiting insurance decision.
Patient has called in and stated that the insurance is requesting a pre Auth for the prescription Bupropion.       contact number  [phone]    she said they would like it to be called in as an urgent request.
Nurse
LDR 10

## 2021-01-05 NOTE — PROGRESS NOTES
Please call pt and let her know that her Vit. D is decreased and that she can take OTC vit. D3 at 5,000 international units. Also, that her cholesterol is still elevated and to continue to work on low fat/low sodium diet and exercise.

## 2021-01-09 ENCOUNTER — PATIENT MESSAGE (OUTPATIENT)
Dept: FAMILY MEDICINE CLINIC | Age: 61
End: 2021-01-09

## 2021-01-09 DIAGNOSIS — N95.2 VAGINAL ATROPHY: Primary | ICD-10-CM

## 2021-01-11 NOTE — TELEPHONE ENCOUNTER
Last Visit: 10/22/20 with ROXANN Kenny  Next Appointment: Advised to follow-up in 1 year  Previous Refill Encounter(s): 2/20/20 #42.5g with 3 refills    Requested Prescriptions     Pending Prescriptions Disp Refills    estradioL (ESTRACE) 0.01 % (0.1 mg/gram) vaginal cream 42.5 g 3     Sig: Insert intravaginally twice weekly

## 2021-01-11 NOTE — TELEPHONE ENCOUNTER
From: Reilly Young  To: Lorelei Granados NP  Sent: 1/9/2021 1:41 PM EST  Subject: Prescription Question    I need a refill for the Estradiol cream but its not in my list of prescriptions so I added it. I was getting the cream thru mail order with [facility] but insurance changed to [facility]. Would like to continue mail order with them please. Refill needed soon. Thank you!

## 2021-01-12 RX ORDER — ESTRADIOL 0.1 MG/G
CREAM VAGINAL
Qty: 42.5 G | Refills: 3 | Status: SHIPPED | OUTPATIENT
Start: 2021-01-12

## 2021-01-14 ENCOUNTER — TELEPHONE (OUTPATIENT)
Dept: FAMILY MEDICINE CLINIC | Age: 61
End: 2021-01-14

## 2021-01-14 NOTE — TELEPHONE ENCOUNTER
Pharmacy is asking for clarification on the Estradiol. They need to know how many gram are being used per dose (1 applicatorful = 4 grams). Please advise.

## 2021-01-28 ENCOUNTER — PATIENT MESSAGE (OUTPATIENT)
Dept: FAMILY MEDICINE CLINIC | Age: 61
End: 2021-01-28

## 2021-01-28 DIAGNOSIS — J45.40 MODERATE PERSISTENT ASTHMA WITHOUT COMPLICATION: Primary | ICD-10-CM

## 2021-02-17 ENCOUNTER — HOSPITAL ENCOUNTER (OUTPATIENT)
Dept: MAMMOGRAPHY | Age: 61
Discharge: HOME | End: 2021-02-17
Attending: NURSE PRACTITIONER
Payer: COMMERCIAL

## 2021-02-17 DIAGNOSIS — Z12.31 ENCOUNTER FOR SCREENING MAMMOGRAM FOR MALIGNANT NEOPLASM OF BREAST: ICD-10-CM

## 2021-02-17 PROCEDURE — 77067 SCR MAMMO BI INCL CAD: CPT

## 2021-08-18 DIAGNOSIS — I10 ESSENTIAL HYPERTENSION: ICD-10-CM

## 2021-08-19 RX ORDER — AMLODIPINE BESYLATE 5 MG/1
TABLET ORAL
Qty: 90 TABLET | Refills: 3 | Status: SHIPPED | OUTPATIENT
Start: 2021-08-19 | End: 2022-07-29

## 2022-03-14 RX ORDER — BUPROPION HYDROCHLORIDE 150 MG/1
150 TABLET, EXTENDED RELEASE ORAL DAILY
Qty: 90 TABLET | Refills: 0 | Status: SHIPPED | OUTPATIENT
Start: 2022-03-14 | End: 2022-05-16

## 2022-03-14 RX ORDER — BUPROPION HYDROCHLORIDE 150 MG/1
TABLET, EXTENDED RELEASE ORAL
Qty: 90 TABLET | Refills: 3 | OUTPATIENT
Start: 2022-03-14

## 2022-03-24 ENCOUNTER — TRANSCRIBE ORDER (OUTPATIENT)
Dept: SCHEDULING | Age: 62
End: 2022-03-24

## 2022-03-24 DIAGNOSIS — Z12.31 VISIT FOR SCREENING MAMMOGRAM: Primary | ICD-10-CM

## 2022-03-29 ENCOUNTER — HOSPITAL ENCOUNTER (OUTPATIENT)
Dept: MAMMOGRAPHY | Age: 62
Discharge: HOME | End: 2022-03-29
Attending: FAMILY MEDICINE
Payer: COMMERCIAL

## 2022-03-29 DIAGNOSIS — Z12.31 VISIT FOR SCREENING MAMMOGRAM: ICD-10-CM

## 2022-03-29 PROCEDURE — 77063 BREAST TOMOSYNTHESIS BI: CPT

## 2022-04-11 ENCOUNTER — HOSPITAL ENCOUNTER (OUTPATIENT)
Dept: LAB | Age: 62
Discharge: HOME | End: 2022-04-11
Payer: COMMERCIAL

## 2022-04-11 ENCOUNTER — OFFICE VISIT (OUTPATIENT)
Dept: FAMILY MEDICINE CLINIC | Age: 62
End: 2022-04-11
Payer: COMMERCIAL

## 2022-04-11 VITALS
HEART RATE: 77 BPM | OXYGEN SATURATION: 95 % | DIASTOLIC BLOOD PRESSURE: 75 MMHG | SYSTOLIC BLOOD PRESSURE: 126 MMHG | RESPIRATION RATE: 16 BRPM | WEIGHT: 163.8 LBS | BODY MASS INDEX: 25.71 KG/M2 | HEIGHT: 67 IN | TEMPERATURE: 97.2 F

## 2022-04-11 DIAGNOSIS — N89.8 VAGINAL DISCHARGE: ICD-10-CM

## 2022-04-11 DIAGNOSIS — Z23 ENCOUNTER FOR IMMUNIZATION: ICD-10-CM

## 2022-04-11 DIAGNOSIS — Z12.72 SPECIAL SCREENING FOR MALIGNANT NEOPLASMS, VAGINA: ICD-10-CM

## 2022-04-11 DIAGNOSIS — N94.10 DYSPAREUNIA IN FEMALE: ICD-10-CM

## 2022-04-11 DIAGNOSIS — N95.2 VAGINAL ATROPHY: ICD-10-CM

## 2022-04-11 DIAGNOSIS — Z01.419 WELL WOMAN EXAM WITH ROUTINE GYNECOLOGICAL EXAM: Primary | ICD-10-CM

## 2022-04-11 PROCEDURE — 99396 PREV VISIT EST AGE 40-64: CPT | Performed by: NURSE PRACTITIONER

## 2022-04-11 PROCEDURE — 87563 M. GENITALIUM AMP PROBE: CPT

## 2022-04-11 PROCEDURE — 88175 CYTOPATH C/V AUTO FLUID REDO: CPT

## 2022-04-11 PROCEDURE — 87624 HPV HI-RISK TYP POOLED RSLT: CPT

## 2022-04-11 PROCEDURE — 90750 HZV VACC RECOMBINANT IM: CPT | Performed by: NURSE PRACTITIONER

## 2022-04-11 RX ORDER — METRONIDAZOLE 500 MG/1
500 TABLET ORAL 2 TIMES DAILY
Qty: 14 TABLET | Refills: 0 | Status: SHIPPED | OUTPATIENT
Start: 2022-04-11 | End: 2022-04-18

## 2022-04-11 NOTE — PROGRESS NOTES
1. \"Have you been to the ER, urgent care clinic since your last visit? Hospitalized since your last visit? \" No    2. \"Have you seen or consulted any other health care providers outside of the [address] [place] [address] since your last visit? \" No     3. For patients aged 39-70: Has the patient had a colonoscopy / FIT/ Cologuard? Yes - no Care Gap present      If the patient is female:    4. For patients aged 41-77: Has the patient had a mammogram within the past 2 years? Yes - no Care Gap present      5. For patients aged 21-65: Has the patient had a pap smear?  Yes - no Care Gap present

## 2022-04-11 NOTE — PATIENT INSTRUCTIONS
Vaccine Information Statement    Recombinant Zoster (Shingles) Vaccine: What You Need to Know    Many Vaccine Information Statements are available in Occitan and other languages. See www.immunize.org/vis  Hojas de información sobre vacunas están disponibles en español y en muchos otros idiomas. Visite www.immunize.org/vis    1. Why get vaccinated? Recombinant zoster (shingles) vaccine can prevent shingles. Shingles (also called herpes zoster, or just zoster) is a painful skin rash, usually with blisters. In addition to the rash, shingles can cause fever, headache, chills, or upset stomach. More rarely, shingles can lead to pneumonia, hearing problems, blindness, brain inflammation (encephalitis), or death. The most common complication of shingles is long-term nerve pain called postherpetic neuralgia (PHN). PHN occurs in the areas where the shingles rash was, even after the rash clears up. It can last for months or years after the rash goes away. The pain from PHN can be severe and debilitating. About 10 to 18% of people who get shingles will experience PHN. The risk of PHN increases with age. An older adult with shingles is more likely to develop PHN and have longer lasting and more severe pain than a younger person with shingles. Shingles is caused by the varicella zoster virus, the same virus that causes chickenpox. After you have chickenpox, the virus stays in your body and can cause shingles later in life. Shingles cannot be passed from one person to another, but the virus that causes shingles can spread and cause chickenpox in someone who had never had chickenpox or received chickenpox vaccine. 2. Recombinant shingles vaccine    Recombinant shingles vaccine provides strong protection against shingles. By preventing shingles, recombinant shingles vaccine also protects against PHN. Recombinant shingles vaccine is the preferred vaccine for the prevention of shingles.   However, a different vaccine, live shingles vaccine, may be used in some circumstances. The recombinant shingles vaccine is recommended for adults 50 years and older without serious immune problems. It is given as a two-dose series. This vaccine is also recommended for people who have already gotten another type of shingles vaccine, the live shingles vaccine. There is no live virus in this vaccine. Shingles vaccine may be given at the same time as other vaccines. 3. Talk with your health care provider    Tell your vaccine provider if the person getting the vaccine:   Has had an allergic reaction after a previous dose of recombinant shingles vaccine, or has any severe, life-threatening allergies.  Is pregnant or breastfeeding.  Is currently experiencing an episode of shingles. In some cases, your health care provider may decide to postpone shingles vaccination to a future visit. People with minor illnesses, such as a cold, may be vaccinated. People who are moderately or severely ill should usually wait until they recover before getting recombinant shingles vaccine. Your health care provider can give you more information. 4. Risks of a vaccine reaction     A sore arm with mild or moderate pain is very common after recombinant shingles vaccine, affecting about 80% of vaccinated people. Redness and swelling can also happen at the site of the injection.  Tiredness, muscle pain, headache, shivering, fever, stomach pain, and nausea happen after vaccination in more than half of people who receive recombinant shingles vaccine. In clinical trials, about 1 out of 6 people who got recombinant zoster vaccine experienced side effects that prevented them from doing regular activities. Symptoms usually went away on their own in 2 to 3 days. You should still get the second dose of recombinant zoster vaccine even if you had one of these reactions after the first dose.       People sometimes faint after medical procedures, including vaccination. Tell your provider if you feel dizzy or have vision changes or ringing in the ears. As with any medicine, there is a very remote chance of a vaccine causing a severe allergic reaction, other serious injury, or death. 5. What if there is a serious problem? An allergic reaction could occur after the vaccinated person leaves the clinic. If you see signs of a severe allergic reaction (hives, swelling of the face and throat, difficulty breathing, a fast heartbeat, dizziness, or weakness), call 9-1-1 and get the person to the nearest hospital.    For other signs that concern you, call your health care provider. Adverse reactions should be reported to the Vaccine Adverse Event Reporting System (VAERS). Your health care provider will usually file this report, or you can do it yourself. Visit the VAERS website at www.vaers. [facility].gov or call 9-754.211.1011. VAERS is only for reporting reactions, and VAERS staff do not give medical advice. 6. How can I learn more?  Ask your health care provider.  Call your local or state health department.    Contact the Centers for Disease Control and Prevention (CDC):  - Call 3-209.700.4034 (1-800-CDC-INFO) or  - Visit CDCs website at www.cdc.gov/vaccines    Vaccine Information Statement   Recombinant Zoster Vaccine   10/30/2019    Department of Health and [facility] for Disease Control and Prevention    Office Use Only

## 2022-04-11 NOTE — PROGRESS NOTES
Subjective:   59 y.o. female for Well Woman Check. Patient's last menstrual period was 06/01/1992. Social History: single partner, contraception - status post hysterectomy. Pertinent past medical hstory: no history of HTN, DVT, CAD, DM, liver disease, migraines or smoking. ROS:  Feeling well. No dyspnea or chest pain on exertion. No abdominal pain, change in bowel habits, black or bloody stools. No urinary tract symptoms. GYN ROS: she complains of pain with intercorse, and a grey discharge for the last few months. Pt has a hx of cervical precancer cell for which she had a partial hysterectomy. She needs a referral to GYN for her current vaginal issues. She has been referred previous for a vaginal lesion, charly connerirasema cannot recall her last visit. No neurological complaints. Objective:     Visit Vitals  /75 (BP 1 Location: Right upper arm, BP Patient Position: Sitting, BP Cuff Size: Small adult)   Pulse 77   Temp 97.2 °F (36.2 °C) (Temporal)   Resp 16   Ht 5' 7\" (1.702 m)   Wt 163 lb 12.8 oz (74.3 kg)   LMP 06/01/1992   SpO2 95%   BMI 25.65 kg/m²     The patient appears well, alert, oriented x 3, in no distress. ENT normal.  Neck supple. No adenopathy or thyromegaly. DAVID. Lungs are clear, good air entry, no wheezes, rhonchi or rales. S1 and S2 normal, no murmurs, regular rate and rhythm. Abdomen soft without tenderness, guarding, mass or organomegaly. Extremities show no edema, normal peripheral pulses. Neurological is normal, no focal findings. BREAST EXAM: not examined, patient declines to have breast exam    PELVIC EXAM: VULVA: normal appearing vulva with no masses, tenderness or lesions, VAGINA: vaginal discharge - green and milky, CERVIX: surgically absent, UTERUS: surgically absent, vaginal cuff well healed    Assessment/Plan:   well woman  return annually or prn    ICD-10-CM ICD-9-CM    1.  Well woman exam with routine gynecological exam  Z01.419 V72.31 PAP IG, APTIMA HPV AND RFX 16/18,45 ([id number])    [V72.31]   2. Special screening for malignant neoplasms, vagina  Z12.72 V76.47 PAP IG, APTIMA HPV AND RFX 16/18,45 ([id number])   3. Encounter for immunization  Z23 V03.89 varicella-zoster recombinant, PF, (SHINGRIX) 50 mcg/0.5 mL susr injection      ZOSTER VACC RECOMBINANT ADJUVANTED      DISCONTINUED: varicella-zoster recombinant, PF, (SHINGRIX) 50 mcg/0.5 mL susr injection   4. Vaginal discharge  N89.8 623.5 NUSWAB VG PLUS+MYCOPLASMAS,SORIN      metroNIDAZOLE (FLAGYL) 500 mg tablet   5. Vaginal atrophy  N95.2 627.3 REFERRAL TO GYNECOLOGY   6. Dyspareunia in female  N94.10 625.0 REFERRAL TO GYNECOLOGY     Follow-up and Dispositions    · Return in about 1 year (around 4/11/2023) for Well Woman (No PAP), (In Office), Labs 1 Week Prior. Colby Dwyer

## 2022-04-19 LAB
A VAGINAE DNA VAG QL NAA+PROBE: NORMAL SCORE
BVAB2 DNA VAG QL NAA+PROBE: NORMAL SCORE
C ALBICANS DNA VAG QL NAA+PROBE: NEGATIVE
C GLABRATA DNA VAG QL NAA+PROBE: NEGATIVE
C TRACH RRNA SPEC QL NAA+PROBE: NEGATIVE
M GENITALIUM DNA SPEC QL NAA+PROBE: NEGATIVE
M HOMINIS DNA SPEC QL NAA+PROBE: NEGATIVE
MEGA1 DNA VAG QL NAA+PROBE: NORMAL SCORE
N GONORRHOEA RRNA SPEC QL NAA+PROBE: NEGATIVE
SPECIMEN SOURCE: NORMAL
T VAGINALIS RRNA SPEC QL NAA+PROBE: NEGATIVE
UREAPLASMA DNA SPEC QL NAA+PROBE: NEGATIVE

## 2022-05-16 RX ORDER — BUPROPION HYDROCHLORIDE 150 MG/1
TABLET, EXTENDED RELEASE ORAL
Qty: 90 TABLET | Refills: 3 | Status: SHIPPED | OUTPATIENT
Start: 2022-05-16

## 2022-07-24 DIAGNOSIS — I10 ESSENTIAL HYPERTENSION: ICD-10-CM

## 2022-07-29 RX ORDER — AMLODIPINE BESYLATE 5 MG/1
TABLET ORAL
Qty: 90 TABLET | Refills: 3 | Status: SHIPPED | OUTPATIENT
Start: 2022-07-29

## 2022-08-03 ENCOUNTER — TELEPHONE (OUTPATIENT)
Dept: FAMILY MEDICINE CLINIC | Age: 62
End: 2022-08-03

## 2022-08-03 NOTE — TELEPHONE ENCOUNTER
Patient stated that she tested positive for COVID on 20 July 2022. She stated that she is experiencing light headed, SOB, dizzy, heavy chest, and a cough. Patient stated that she wanted an appointment to be made in office. Staff explained to her that due to her having COVID like symptoms she can be seen as a virtual but not in office. She stated that she can do a virtual and wanted to be scheduled in the next 15 minutes. Staff explained to her that we did not have any available appointments for today but we do have one for tomorrow afternoon at 1430. She stated that she did not want to wait that long, denied the appointment, and asked if someone could just prescribe her medications without being seen. Staff explained to her that we cannot prescribe medications without being seen due to the fact that a wrong medication could be prescribed to her and could cause worsening symptoms, or that a medication may not be needed at all, and the provider needs to see her due to that. She stated that [facility].

## 2022-08-03 NOTE — TELEPHONE ENCOUNTER
Pt calling on the nurse triage line. She tested positive on 7/13/22 but it still having symptoms that seem to be getting worse. The pt is light headed, SOB, dizzy, heavy chest, and a cough. She has been taking cough medicine to help with the symptoms. Please advise.      [phone]

## 2023-01-26 ENCOUNTER — OFFICE VISIT (OUTPATIENT)
Dept: FAMILY MEDICINE CLINIC | Age: 63
End: 2023-01-26
Payer: COMMERCIAL

## 2023-01-26 VITALS
RESPIRATION RATE: 16 BRPM | HEIGHT: 67 IN | TEMPERATURE: 97.5 F | OXYGEN SATURATION: 95 % | SYSTOLIC BLOOD PRESSURE: 107 MMHG | DIASTOLIC BLOOD PRESSURE: 69 MMHG | HEART RATE: 75 BPM | BODY MASS INDEX: 25.74 KG/M2 | WEIGHT: 164 LBS

## 2023-01-26 DIAGNOSIS — I83.93 SPIDER VEINS OF BOTH LOWER EXTREMITIES: ICD-10-CM

## 2023-01-26 DIAGNOSIS — I83.93 VARICOSE VEINS OF BOTH LOWER EXTREMITIES, UNSPECIFIED WHETHER COMPLICATED: ICD-10-CM

## 2023-01-26 DIAGNOSIS — M21.611 BUNION OF GREAT TOE OF RIGHT FOOT: ICD-10-CM

## 2023-01-26 DIAGNOSIS — M79.671 RIGHT FOOT PAIN: Primary | ICD-10-CM

## 2023-01-26 PROCEDURE — 3078F DIAST BP <80 MM HG: CPT | Performed by: NURSE PRACTITIONER

## 2023-01-26 PROCEDURE — 99213 OFFICE O/P EST LOW 20 MIN: CPT | Performed by: NURSE PRACTITIONER

## 2023-01-26 PROCEDURE — 3074F SYST BP LT 130 MM HG: CPT | Performed by: NURSE PRACTITIONER

## 2023-01-26 RX ORDER — MELATONIN 2.5MG/10ML
LIQUID (ML) ORAL
COMMUNITY

## 2023-01-26 RX ORDER — AA/PROT/LYSINE/METHIO/VIT C/B6 50-12.5 MG
TABLET ORAL DAILY
COMMUNITY

## 2023-01-26 NOTE — PROGRESS NOTES
1. \"Have you been to the ER, urgent care clinic since your last visit? Hospitalized since your last visit? \" No    2. \"Have you seen or consulted any other health care providers outside of the [address] [place] [address] since your last visit? \"  Gastroenterology for connective tissue disease       3. For patients aged 39-70: Has the patient had a colonoscopy / FIT/ Cologuard? Yes - Care Gap present. Most recent result on file      If the patient is female:    4. For patients aged 41-77: Has the patient had a mammogram within the past 2 years? Yes - Care Gap present. Most recent result on file      5. For patients aged 21-65: Has the patient had a pap smear? Yes - Care Gap present.  Most recent result on file

## 2023-01-26 NOTE — PROGRESS NOTES
General Office Visit Note    Assessment/Plan:     {There are no diagnoses linked to this encounter. (Refresh or delete this SmartLink)}         Subjective:     Hoang Cartwright is a 57 y.o. y.o. female who complains of   Chief Complaint   Patient presents with    Joint Pain    Foot Pain     Right foot     Joint/Muscle Pain  Patient presents for evaluation of arthralgia for several year(s). Pain is located in right foot, and right bunion, is described as aching, boring, dull, sharp, throbbing, stiffness, and is intermittent, moderate . Associated symptoms include: edema, erythema, decreased ROM, tenderness, warmth. The patient has tried ibuprofen for pain, with minimal relief. Related to injury: no.     Past Medical History:   Diagnosis Date    Abnormal pap     ASCUS +HPV    Asthma 6/6/2012    Cancer ([facility] [address]     skin, basal cell    Contact dermatitis and other eczema, due to unspecified cause     basal cell cancer    ULISES (generalised anxiety disorder)     GERD (gastroesophageal reflux disease) 6/29/2010    Hypercholesterolemia     Step I diet    MVP (mitral valve prolapse)     Pneumonia     4 times    Sciatica 6/6/2012     Past Surgical History:   Procedure Laterality Date    HX COLONOSCOPY  5/13/2015    follow up 5 years    HX GYN      hysterectomy    HX HYSTERECTOMY       Social History     Socioeconomic History    Marital status:    Tobacco Use    Smoking status: Never    Smokeless tobacco: Never   Substance and Sexual Activity    Alcohol use:  Yes     Alcohol/week: 0.0 standard drinks     Comment: occasional    Drug use: No    Sexual activity: Yes     Partners: Male     Social Determinants of Health     Financial Resource Strain: Low Risk     Difficulty of Paying Living Expenses: Not hard at all   Food Insecurity: No Food Insecurity    Worried About Running Out of Food in the Last Year: Never true    Ran Out of Food in the Last Year: Never true     Current Outpatient Medications   Medication Sig Dispense Refill amLODIPine (NORVASC) 5 mg tablet TAKE 1 TABLET BY MOUTH  DAILY 90 Tablet 3    buPROPion SR (WELLBUTRIN SR) 150 mg SR tablet TAKE 1 TABLET BY MOUTH  DAILY 90 Tablet 3    loratadine (CLARITIN) 10 mg tablet Take 10 mg by mouth as needed. hydrOXYchloroQUINE (PLAQUENIL) 200 mg tablet Take 200 mg by mouth two (2) times a day. aspirin delayed-release 81 mg tablet Take 81 mg by mouth daily. estradioL (ESTRACE) 0.01 % (0.1 mg/gram) vaginal cream Insert intravaginally twice weekly (Patient not taking: Reported on 4/11/2022) 42.5 g 3    calcium citrate-vitamin d3 (CITRACAL+D) 315-200 mg-unit tab Take 1 Tab by mouth daily (with breakfast). (Patient not taking: Reported on 1/26/2023)      ADVAIR -21 mcg/actuation inhaler INHALE 2 PUFFS BY MOUTH TWICE DAILY (Patient not taking: No sig reported) 12 Inhaler 4    MULTIVITS W-FE,OTHER MIN/LUT (CENTRUM SILVER ULTRA WOMEN'S PO) Take  by mouth. (Patient not taking: No sig reported)      fluticasone (FLONASE) 50 mcg/actuation nasal spray 2 Sprays by Both Nostrils route daily as needed for Rhinitis. Administer to right and left nostril. (Patient not taking: Reported on 4/11/2022) 3 Bottle 3    PROAIR HFA 90 mcg/actuation inhaler INHALE 2 PUFFS EVERY 4 HOURS AS NEEDED FOR WHEEZING (Patient not taking: No sig reported) 1 Inhaler 0    lansoprazole (PREVACID) 30 mg capsule Take 30 mg by mouth daily (before breakfast).  (Patient not taking: No sig reported)       Allergies   Allergen Reactions    Bactrim [Sulfamethoxazole-Trimethoprim] Rash    Effexor [Venlafaxine] Other (comments)     Effect mitral valve prolapse    Paxil [Paroxetine Hcl] Other (comments)      medication would effect patients  mitral valve prolapse    Penicillins Itching    Sulfa (Sulfonamide Antibiotics) Rash    Zoloft [Sertraline] Other (comments)     Effect mitral valve prolapse     The patient has a family history of    REVIEW OF SYSTEMS  ROS    Objective:     Visit Vitals  /69 (BP 1 Location: Right upper arm, BP Patient Position: Sitting, BP Cuff Size: Adult)   Pulse 75   Temp 97.5 °F (36.4 °C) (Temporal)   Resp 16   Ht 5' 7\" (1.702 m)   Wt 164 lb (74.4 kg)   LMP 06/01/1992   SpO2 95%   BMI 25.69 kg/m²       Current Outpatient Medications   Medication Instructions    ADVAIR -21 mcg/actuation inhaler INHALE 2 PUFFS BY MOUTH TWICE DAILY    amLODIPine (NORVASC) 5 mg tablet TAKE 1 TABLET BY MOUTH  DAILY    aspirin delayed-release 81 mg, DAILY    buPROPion SR (WELLBUTRIN SR) 150 mg SR tablet TAKE 1 TABLET BY MOUTH  DAILY    calcium citrate-vitamin d3 (CITRACAL+D) 315-200 mg-unit tab 1 Tablet, DAILY WITH BREAKFAST    estradioL (ESTRACE) 0.01 % (0.1 mg/gram) vaginal cream Insert intravaginally twice weekly    fluticasone (FLONASE) 50 mcg/actuation nasal spray 2 Sprays, Both Nostrils, DAILY AS NEEDED, Administer to right and left nostril.    hydrOXYchloroQUINE (PLAQUENIL) 200 mg, 2 TIMES DAILY    lansoprazole (PREVACID) 30 mg, DAILY BEFORE BREAKFAST    loratadine (CLARITIN) 10 mg, Oral, AS NEEDED    MULTIVITS W-FE,OTHER MIN/LUT (CENTRUM SILVER ULTRA WOMEN'S PO) Take  by mouth. PROAIR HFA 90 mcg/actuation inhaler INHALE 2 PUFFS EVERY 4 HOURS AS NEEDED FOR WHEEZING        PHYSICAL EXAM  Physical Exam    Disclaimer: The patient understands our medical plan. Alternatives have been explained and offered. The risks, benefits and significant side effects of all medications have been reviewed. Anticipated time course and progression of condition reviewed. All questions have been addressed. She is encouraged to employ the information provided in the after visit summary, which was reviewed. Where applicable, she is instructed to call the clinic if she has not been notified either by phone or through [address] with the results of her tests or with an appointment plan for any referrals within 1 week(s). No news is not good news; it's no news.  The patient  is to call if her condition worsens or fails to improve or if significant side effects are experienced. Aspects of this note may have been generated using voice recognition software. Despite editing, there may be unrecognized errors. condition worsens or fails to improve or if significant side effects are experienced. Please note that this dictation was completed with [employer], the computer voice recognition software. Quite often unanticipated grammatical, syntax, homophones, and other interpretive errors are inadvertently transcribed by the computer software. Please disregard these errors. Please excuse any errors that have escaped final proofreading.     Edgar Guidry NP     1/26/2023 Electrodesiccation And Curettage Text: The wound bed was treated with electrodesiccation and curettage after the biopsy was performed.

## 2023-02-23 NOTE — PROGRESS NOTES
Shamar Diaz    Chief Complaint   Patient presents with    Varicose Veins     Referred by NP       History and Physical    Shamar Diaz is a 57 y.o. female with PMH significant for Raynaud disease, HTN, CREST syndrome, GERD, Asthma, anxiety, basal cell carcinoma and sciatica. she presents today for evaluation of spider veins. she describes spider veins bilaterally. She states that she's had a lot of spider veins since the late . She was seen by her PCP who recommended being evaluated by vascular    Endorses discoloration of of the gaiter region over the past several     Endorses edema over the course of the day, L worse thanR. Associated with some tightness. Endorses pain in a cluster of varicose veins in the posterior right knee, especially after exercise. At times clusters of reticular veins and varicose veins are sensitive. Endorses restless leg symptoms. Onset of symptoms was about 20+ years ago and has been worsening over the past several years. Works at [employer] all day, tries to get up often. Associated symptoms:   [x] edema  [x] varicose veins  [] heaviness/aching  [] fatigue  [] pain    Aggravating factors include sitting prolong. Relieving factors include elevations. Patient   [x] has   [] has not   worn compression stockings. She states that she noticed edema above the sock line.        Relevant history:   [x] female gender  [x] Family history of venous disease: mother  [] history of pregnancy:    [] history of DVT/PE  [x] history of vein procedure - laser treatment of spider veins                        Past Medical History:   Diagnosis Date    Abnormal pap     ASCUS +HPV    Asthma 2012    Cancer ([facility] [address]     skin, basal cell    Contact dermatitis and other eczema, due to unspecified cause     basal cell cancer    GERD (gastroesophageal reflux disease) 2010    Hypercholesterolemia     Step I diet    MVP (mitral valve prolapse)     Pneumonia     4

## 2023-02-24 ENCOUNTER — OFFICE VISIT (OUTPATIENT)
Age: 63
End: 2023-02-24
Payer: COMMERCIAL

## 2023-02-24 VITALS
HEIGHT: 67 IN | SYSTOLIC BLOOD PRESSURE: 120 MMHG | BODY MASS INDEX: 25.43 KG/M2 | HEART RATE: 76 BPM | WEIGHT: 162 LBS | DIASTOLIC BLOOD PRESSURE: 80 MMHG | OXYGEN SATURATION: 84 %

## 2023-02-24 DIAGNOSIS — I83.813 VARICOSE VEINS OF BOTH LOWER EXTREMITIES WITH PAIN: Primary | ICD-10-CM

## 2023-02-24 PROCEDURE — 99203 OFFICE O/P NEW LOW 30 MIN: CPT | Performed by: SURGERY

## 2023-02-24 PROCEDURE — 3078F DIAST BP <80 MM HG: CPT | Performed by: SURGERY

## 2023-02-24 PROCEDURE — 3074F SYST BP LT 130 MM HG: CPT | Performed by: SURGERY

## 2023-02-24 RX ORDER — HYDROXYCHLOROQUINE SULFATE 200 MG/1
TABLET, FILM COATED ORAL
COMMUNITY
Start: 2023-02-09

## 2023-02-24 RX ORDER — AMLODIPINE BESYLATE 5 MG/1
TABLET ORAL
COMMUNITY
Start: 2023-02-09

## 2023-02-24 RX ORDER — BUPROPION HYDROCHLORIDE 150 MG/1
TABLET, EXTENDED RELEASE ORAL
COMMUNITY
Start: 2023-02-21

## 2023-02-24 NOTE — PATIENT INSTRUCTIONS
In preparation for your venous ultrasound (reflux study), please do the following:   Do not wear compression stockings for 4 days prior to the ultrasound  Do not consume caffeine, including coffee, tea, soda or other caffeine containing soft drink, for 24 hours before the ultrasound  Please make sure that you are well hydrated when you present for the ultrasound. This is a complex and detailed study and will require about 1.5 hours of your time. You need to be able to lie on a bed for the majority of that time.

## 2023-03-21 PROBLEM — I83.813 VARICOSE VEINS OF BOTH LOWER EXTREMITIES WITH PAIN: Status: ACTIVE | Noted: 2023-03-21

## 2023-03-21 NOTE — PROGRESS NOTES
Mt Tompkins    Chief Complaint   Patient presents with    Varicose Veins     Follow up with studies          History and Physical    Mt Tompkins is a 57 y.o. female with PMH significant for Raynaud disease, HTN, CREST syndrome, GERD, Asthma, anxiety, basal cell carcinoma and sciatica. she returns today for evaluation of spider veins. She states there have been no changes since her last visit. Previously obtained venous history:  she describes spider veins bilaterally. She states that she's had a lot of spider veins since the late s. She was seen by her PCP who recommended being evaluated by vascular    Endorses discoloration of of the gaiter region over the past several     Endorses edema over the course of the day, L worse thanR. Associated with some tightness. Endorses pain in a cluster of varicose veins in the posterior right knee, especially after exercise. At times clusters of reticular veins and varicose veins are sensitive. Endorses restless leg symptoms. Onset of symptoms was about 20+ years ago and has been worsening over the past several years. Works at [employer] all day, tries to get up often. Associated symptoms:   [x] edema  [x] varicose veins  [] heaviness/aching  [] fatigue  [] pain    Aggravating factors include sitting prolong. Relieving factors include elevations. Patient   [x] has   [] has not   worn compression stockings. She states that she noticed edema above the sock line. Relevant history:   [x] female gender  [x] Family history of venous disease: mother  [] history of pregnancy:    [] history of DVT/PE  [x] history of vein procedure - laser treatment of spider veins                        The most recent PVL was reviewed and discussed with the patient (study date 3/15/2023). This shows no axial insufficiency but multiple perforators associated with refluxing varicose veins in both distal legs.  .     Interpretation Summary

## 2023-03-22 ENCOUNTER — OFFICE VISIT (OUTPATIENT)
Age: 63
End: 2023-03-22
Payer: COMMERCIAL

## 2023-03-22 VITALS
WEIGHT: 162 LBS | SYSTOLIC BLOOD PRESSURE: 98 MMHG | DIASTOLIC BLOOD PRESSURE: 60 MMHG | HEIGHT: 67 IN | OXYGEN SATURATION: 97 % | BODY MASS INDEX: 25.43 KG/M2 | HEART RATE: 82 BPM

## 2023-03-22 DIAGNOSIS — I78.1 TELANGIECTASIA OF EXTREMITY: ICD-10-CM

## 2023-03-22 DIAGNOSIS — I83.813 VARICOSE VEINS OF BOTH LOWER EXTREMITIES WITH PAIN: Primary | ICD-10-CM

## 2023-03-22 PROCEDURE — 99213 OFFICE O/P EST LOW 20 MIN: CPT | Performed by: SURGERY

## 2023-03-22 PROCEDURE — 3078F DIAST BP <80 MM HG: CPT | Performed by: SURGERY

## 2023-03-22 PROCEDURE — 3074F SYST BP LT 130 MM HG: CPT | Performed by: SURGERY

## 2023-04-19 ENCOUNTER — HOSPITAL ENCOUNTER (OUTPATIENT)
Facility: HOSPITAL | Age: 63
Setting detail: SPECIMEN
Discharge: HOME | End: 2023-04-22

## 2023-04-19 DIAGNOSIS — Z13.220 SCREENING FOR CHOLESTEROL LEVEL: ICD-10-CM

## 2023-04-19 DIAGNOSIS — Z13.29 SCREENING FOR THYROID DISORDER: ICD-10-CM

## 2023-04-19 DIAGNOSIS — Z11.59 ENCOUNTER FOR HEPATITIS C SCREENING TEST FOR LOW RISK PATIENT: ICD-10-CM

## 2023-04-19 DIAGNOSIS — I10 PRIMARY HYPERTENSION: ICD-10-CM

## 2023-04-19 DIAGNOSIS — I10 PRIMARY HYPERTENSION: Primary | ICD-10-CM

## 2023-04-19 LAB
ANION GAP SERPL CALC-SCNC: 3 MMOL/L (ref 3–18)
BUN SERPL-MCNC: 24 MG/DL (ref 7–18)
BUN/CREAT SERPL: 29 (ref 12–20)
CALCIUM SERPL-MCNC: 9.6 MG/DL (ref 8.5–10.1)
CHLORIDE SERPL-SCNC: 108 MMOL/L (ref 100–111)
CHOLEST SERPL-MCNC: 201 MG/DL
CO2 SERPL-SCNC: 28 MMOL/L (ref 21–32)
CREAT SERPL-MCNC: 0.82 MG/DL (ref 0.6–1.3)
GLUCOSE SERPL-MCNC: 96 MG/DL (ref 74–99)
HDLC SERPL-MCNC: 62 MG/DL (ref 40–60)
HDLC SERPL: 3.2 (ref 0–5)
LDLC SERPL CALC-MCNC: 117.8 MG/DL (ref 0–100)
LIPID PANEL: ABNORMAL
POTASSIUM SERPL-SCNC: 4.3 MMOL/L (ref 3.5–5.5)
SODIUM SERPL-SCNC: 139 MMOL/L (ref 136–145)
TRIGL SERPL-MCNC: 106 MG/DL
TSH SERPL DL<=0.05 MIU/L-ACNC: 2.55 UIU/ML (ref 0.36–3.74)
VLDLC SERPL CALC-MCNC: 21.2 MG/DL

## 2023-04-19 PROCEDURE — 80061 LIPID PANEL: CPT

## 2023-04-19 PROCEDURE — 80048 BASIC METABOLIC PNL TOTAL CA: CPT

## 2023-04-19 PROCEDURE — 87522 HEPATITIS C REVRS TRNSCRPJ: CPT

## 2023-04-19 PROCEDURE — 84443 ASSAY THYROID STIM HORMONE: CPT

## 2023-04-19 PROCEDURE — 36415 COLL VENOUS BLD VENIPUNCTURE: CPT

## 2023-04-19 PROCEDURE — 86803 HEPATITIS C AB TEST: CPT

## 2023-04-22 LAB
HCV IGG SERPL QL IA: REACTIVE S/CO RATIO
HCV RNA SERPL NAA+PROBE-ACNC: NORMAL IU/ML
INTERPRETATION: NORMAL
REF LAB TEST REF RANGE: NORMAL

## 2023-04-24 SDOH — ECONOMIC STABILITY: HOUSING INSECURITY
IN THE LAST 12 MONTHS, WAS THERE A TIME WHEN YOU DID NOT HAVE A STEADY PLACE TO SLEEP OR SLEPT IN A SHELTER (INCLUDING NOW)?: NO

## 2023-04-24 SDOH — ECONOMIC STABILITY: FOOD INSECURITY: WITHIN THE PAST 12 MONTHS, THE FOOD YOU BOUGHT JUST DIDN'T LAST AND YOU DIDN'T HAVE MONEY TO GET MORE.: NEVER TRUE

## 2023-04-24 SDOH — ECONOMIC STABILITY: FOOD INSECURITY: WITHIN THE PAST 12 MONTHS, YOU WORRIED THAT YOUR FOOD WOULD RUN OUT BEFORE YOU GOT MONEY TO BUY MORE.: NEVER TRUE

## 2023-04-24 SDOH — ECONOMIC STABILITY: TRANSPORTATION INSECURITY
IN THE PAST 12 MONTHS, HAS LACK OF TRANSPORTATION KEPT YOU FROM MEETINGS, WORK, OR FROM GETTING THINGS NEEDED FOR DAILY LIVING?: NO

## 2023-04-24 SDOH — ECONOMIC STABILITY: INCOME INSECURITY: HOW HARD IS IT FOR YOU TO PAY FOR THE VERY BASICS LIKE FOOD, HOUSING, MEDICAL CARE, AND HEATING?: NOT HARD AT ALL

## 2023-04-26 ENCOUNTER — OFFICE VISIT (OUTPATIENT)
Age: 63
End: 2023-04-26
Payer: COMMERCIAL

## 2023-04-26 VITALS
DIASTOLIC BLOOD PRESSURE: 70 MMHG | TEMPERATURE: 97.9 F | SYSTOLIC BLOOD PRESSURE: 104 MMHG | HEIGHT: 67 IN | OXYGEN SATURATION: 96 % | RESPIRATION RATE: 18 BRPM | BODY MASS INDEX: 25.08 KG/M2 | WEIGHT: 159.8 LBS | HEART RATE: 88 BPM

## 2023-04-26 DIAGNOSIS — I10 PRIMARY HYPERTENSION: ICD-10-CM

## 2023-04-26 DIAGNOSIS — Z11.59 ENCOUNTER FOR HCV SCREENING TEST FOR HIGH RISK PATIENT: ICD-10-CM

## 2023-04-26 DIAGNOSIS — Z91.89 ENCOUNTER FOR HCV SCREENING TEST FOR HIGH RISK PATIENT: ICD-10-CM

## 2023-04-26 DIAGNOSIS — Z00.00 ENCOUNTER FOR WELL ADULT EXAM WITHOUT ABNORMAL FINDINGS: Primary | ICD-10-CM

## 2023-04-26 DIAGNOSIS — Z12.31 ENCOUNTER FOR SCREENING MAMMOGRAM FOR BREAST CANCER: ICD-10-CM

## 2023-04-26 DIAGNOSIS — M79.671 PAIN IN RIGHT FOOT: ICD-10-CM

## 2023-04-26 DIAGNOSIS — M21.611 BUNION OF RIGHT FOOT: ICD-10-CM

## 2023-04-26 PROCEDURE — 99396 PREV VISIT EST AGE 40-64: CPT | Performed by: NURSE PRACTITIONER

## 2023-04-26 PROCEDURE — 3074F SYST BP LT 130 MM HG: CPT | Performed by: NURSE PRACTITIONER

## 2023-04-26 PROCEDURE — 3078F DIAST BP <80 MM HG: CPT | Performed by: NURSE PRACTITIONER

## 2023-04-26 RX ORDER — ASPIRIN 81 MG/1
TABLET ORAL
COMMUNITY

## 2023-04-26 RX ORDER — AMOXICILLIN 500 MG
CAPSULE ORAL
COMMUNITY
Start: 2023-01-01

## 2023-04-26 RX ORDER — AMLODIPINE BESYLATE 5 MG/1
TABLET ORAL
Qty: 90 TABLET | Refills: 1 | Status: SHIPPED | COMMUNITY
Start: 2023-04-26 | End: 2023-04-26 | Stop reason: SDUPTHER

## 2023-04-26 RX ORDER — MONTELUKAST SODIUM 10 MG/1
10 TABLET ORAL DAILY
Qty: 90 TABLET | Refills: 0 | Status: SHIPPED | OUTPATIENT
Start: 2023-04-26

## 2023-04-26 RX ORDER — AMLODIPINE BESYLATE 5 MG/1
5 TABLET ORAL DAILY
Qty: 90 TABLET | Refills: 1 | Status: SHIPPED | OUTPATIENT
Start: 2023-04-26

## 2023-04-26 ASSESSMENT — PATIENT HEALTH QUESTIONNAIRE - PHQ9
SUM OF ALL RESPONSES TO PHQ9 QUESTIONS 1 & 2: 0
SUM OF ALL RESPONSES TO PHQ QUESTIONS 1-9: 0
SUM OF ALL RESPONSES TO PHQ QUESTIONS 1-9: 0
2. FEELING DOWN, DEPRESSED OR HOPELESS: 0
SUM OF ALL RESPONSES TO PHQ QUESTIONS 1-9: 0
SUM OF ALL RESPONSES TO PHQ QUESTIONS 1-9: 0
1. LITTLE INTEREST OR PLEASURE IN DOING THINGS: 0

## 2023-08-23 RX ORDER — MONTELUKAST SODIUM 10 MG/1
10 TABLET ORAL DAILY
Qty: 30 TABLET | Refills: 0 | Status: SHIPPED | OUTPATIENT
Start: 2023-08-23

## 2023-08-23 NOTE — TELEPHONE ENCOUNTER
Last appointment: 4/26/2023    Next appointment: none      Patient requesting refill for     montelukast (SINGULAIR) 10 MG tablet      [phone]

## 2023-09-13 DIAGNOSIS — I10 PRIMARY HYPERTENSION: ICD-10-CM

## 2023-09-14 RX ORDER — AMLODIPINE BESYLATE 5 MG/1
5 TABLET ORAL DAILY
Qty: 90 TABLET | Refills: 3 | OUTPATIENT
Start: 2023-09-14

## 2023-09-19 ENCOUNTER — TELEPHONE (OUTPATIENT)
Age: 63
End: 2023-09-19

## 2023-09-19 NOTE — TELEPHONE ENCOUNTER
----- Message from Simonenoc Pedroza sent at 9/15/2023  2:46 PM EDT -----  Subject: Message to Provider    QUESTIONS  Information for Provider? Patient needs to schedule fasting labs. Please   call asap to schedule for patient  ---------------------------------------------------------------------------  --------------  [address]  [phone]; OK to leave message on voicemail  ---------------------------------------------------------------------------  --------------  SCRIPT ANSWERS  Relationship to Patient?  Self

## 2023-09-25 RX ORDER — MONTELUKAST SODIUM 10 MG/1
10 TABLET ORAL DAILY
Qty: 30 TABLET | Refills: 2 | Status: SHIPPED | OUTPATIENT
Start: 2023-09-25

## 2023-09-25 NOTE — TELEPHONE ENCOUNTER
Last Visit: 05- OV   Next Appointment: none  Previous Refill Encounter: 08- #30 tabs with 0 refills      Requested Prescriptions     Pending Prescriptions Disp Refills    montelukast (SINGULAIR) 10 MG tablet [Pharmacy Med Name: MONTELUKAST SOD 10 MG TABLET] 30 tablet 2     Sig: TAKE 1 TABLET BY MOUTH EVERY DAY

## 2023-09-26 RX ORDER — MONTELUKAST SODIUM 10 MG/1
10 TABLET ORAL DAILY
Qty: 30 TABLET | Refills: 11 | OUTPATIENT
Start: 2023-09-26

## 2023-10-05 RX ORDER — BUPROPION HYDROCHLORIDE 150 MG/1
TABLET, EXTENDED RELEASE ORAL
Qty: 90 TABLET | Refills: 3 | Status: SHIPPED | OUTPATIENT
Start: 2023-10-05

## 2023-10-05 NOTE — TELEPHONE ENCOUNTER
Last Visit: 05- OV   Next Appointment: none  Previous Refill Encounter: 04- #90tabs with 1 refills      Requested Prescriptions     Pending Prescriptions Disp Refills    buPROPion (WELLBUTRIN SR) 150 MG extended release tablet [Pharmacy Med Name: buPROPion HCl ER (SR) 150 MG Oral Tablet Extended Release 12 Hour] 90 tablet 3     Sig: TAKE 1 TABLET BY MOUTH DAILY

## 2023-10-16 RX ORDER — MONTELUKAST SODIUM 10 MG/1
10 TABLET ORAL DAILY
Qty: 30 TABLET | Refills: 11 | OUTPATIENT
Start: 2023-10-16

## 2023-10-24 ENCOUNTER — NURSE ONLY (OUTPATIENT)
Age: 63
End: 2023-10-24
Payer: COMMERCIAL

## 2023-10-24 ENCOUNTER — HOSPITAL ENCOUNTER (OUTPATIENT)
Facility: HOSPITAL | Age: 63
Discharge: HOME | End: 2023-10-27
Payer: COMMERCIAL

## 2023-10-24 DIAGNOSIS — Z11.59 ENCOUNTER FOR HCV SCREENING TEST FOR HIGH RISK PATIENT: ICD-10-CM

## 2023-10-24 DIAGNOSIS — Z91.89 ENCOUNTER FOR HCV SCREENING TEST FOR HIGH RISK PATIENT: ICD-10-CM

## 2023-10-24 LAB
ALBUMIN SERPL-MCNC: 3.8 G/DL (ref 3.4–5)
ALBUMIN/GLOB SERPL: 0.9 (ref 0.8–1.7)
ALP SERPL-CCNC: 58 U/L (ref 45–117)
ALT SERPL-CCNC: 175 U/L (ref 13–56)
AST SERPL-CCNC: 86 U/L (ref 10–38)
BILIRUB DIRECT SERPL-MCNC: 0.1 MG/DL (ref 0–0.2)
BILIRUB SERPL-MCNC: 0.4 MG/DL (ref 0.2–1)
GLOBULIN SER CALC-MCNC: 4.3 G/DL (ref 2–4)
PROT SERPL-MCNC: 8.1 G/DL (ref 6.4–8.2)

## 2023-10-24 PROCEDURE — 86803 HEPATITIS C AB TEST: CPT

## 2023-10-24 PROCEDURE — 80076 HEPATIC FUNCTION PANEL: CPT

## 2023-10-24 PROCEDURE — 36415 COLL VENOUS BLD VENIPUNCTURE: CPT

## 2023-10-25 LAB
HCV AB SERPL QL IA: NORMAL
HCV IGG SERPL QL IA: NON REACTIVE S/CO RATIO

## 2023-10-30 RX ORDER — MONTELUKAST SODIUM 10 MG/1
10 TABLET ORAL DAILY
Qty: 90 TABLET | Refills: 1 | Status: SHIPPED | OUTPATIENT
Start: 2023-10-30

## 2023-12-04 ENCOUNTER — TELEPHONE (OUTPATIENT)
Age: 63
End: 2023-12-04

## 2023-12-04 NOTE — TELEPHONE ENCOUNTER
----- Message from [address] [place] [address] sent at 11/29/2023  3:23 PM EST -----  Subject: Referral Request    Reason for referral request? cardiologist - pt has episodes of irregular   heart beats that has lasted for 8-9 hrs, her cardiologist needs new   referral  Provider patient wants to be referred to(if known):     Provider Phone Number(if known):[phone]    Additional Information for Provider?  please call pt when referral is done,   pt wants to go to office on [facility]   ---------------------------------------------------------------------------  --------------  [address]    [phone]; OK to leave message on voicemail  ---------------------------------------------------------------------------  --------------

## 2023-12-11 ENCOUNTER — OFFICE VISIT (OUTPATIENT)
Age: 63
End: 2023-12-11
Payer: COMMERCIAL

## 2023-12-11 ENCOUNTER — TELEPHONE (OUTPATIENT)
Age: 63
End: 2023-12-11

## 2023-12-11 VITALS
SYSTOLIC BLOOD PRESSURE: 136 MMHG | HEART RATE: 76 BPM | BODY MASS INDEX: 25.21 KG/M2 | HEIGHT: 67 IN | RESPIRATION RATE: 18 BRPM | WEIGHT: 160.6 LBS | OXYGEN SATURATION: 95 % | TEMPERATURE: 97.3 F | DIASTOLIC BLOOD PRESSURE: 84 MMHG

## 2023-12-11 DIAGNOSIS — I10 PRIMARY HYPERTENSION: ICD-10-CM

## 2023-12-11 DIAGNOSIS — R00.2 PALPITATIONS: Primary | ICD-10-CM

## 2023-12-11 DIAGNOSIS — I34.1 MVP (MITRAL VALVE PROLAPSE): ICD-10-CM

## 2023-12-11 DIAGNOSIS — R94.31 ABNORMAL EKG: ICD-10-CM

## 2023-12-11 PROCEDURE — 93000 ELECTROCARDIOGRAM COMPLETE: CPT | Performed by: NURSE PRACTITIONER

## 2023-12-11 PROCEDURE — 3074F SYST BP LT 130 MM HG: CPT | Performed by: NURSE PRACTITIONER

## 2023-12-11 PROCEDURE — 3078F DIAST BP <80 MM HG: CPT | Performed by: NURSE PRACTITIONER

## 2023-12-11 PROCEDURE — 1123F ACP DISCUSS/DSCN MKR DOCD: CPT | Performed by: NURSE PRACTITIONER

## 2023-12-11 PROCEDURE — 99214 OFFICE O/P EST MOD 30 MIN: CPT | Performed by: NURSE PRACTITIONER

## 2023-12-11 RX ORDER — LORATADINE 10 MG/1
10 TABLET ORAL DAILY
COMMUNITY

## 2023-12-11 ASSESSMENT — PATIENT HEALTH QUESTIONNAIRE - PHQ9
2. FEELING DOWN, DEPRESSED OR HOPELESS: 0
SUM OF ALL RESPONSES TO PHQ QUESTIONS 1-9: 0
SUM OF ALL RESPONSES TO PHQ QUESTIONS 1-9: 0
1. LITTLE INTEREST OR PLEASURE IN DOING THINGS: 0
SUM OF ALL RESPONSES TO PHQ9 QUESTIONS 1 & 2: 0
SUM OF ALL RESPONSES TO PHQ QUESTIONS 1-9: 0
SUM OF ALL RESPONSES TO PHQ QUESTIONS 1-9: 0

## 2023-12-11 NOTE — PROGRESS NOTES
1. \"Have you been to the ER, urgent care clinic since your last visit? Hospitalized since your last visit? \" No    2. \"Have you seen or consulted any other health care providers outside of the [address] [place] [address] since your last visit? \" No     3. For patients aged 43-73: Has the patient had a colonoscopy / FIT/ Cologuard? Yes - no Care Gap present      If the patient is female:    4. For patients aged 43-66: Has the patient had a mammogram within the past 2 years? No      5. For patients aged 21-65: Has the patient had a pap smear?  Yes - no Care Gap present
experienced. Please note that this dictation was completed with [employer], the computer voice recognition software. Quite often unanticipated grammatical, syntax, homophones, and other interpretive errors are inadvertently transcribed by the computer software. Please disregard these errors. Please excuse any errors that have escaped final proofreading.     JACKY Escobar NP     12/13/2023

## 2023-12-11 NOTE — TELEPHONE ENCOUNTER
Kary Stephens from [address] [place] called to request copy of patient's EKG stripping fax # [phone]

## 2023-12-11 NOTE — TELEPHONE ENCOUNTER
Called patient and notified has been scheduled for tomorrow at 0930 at the following location and should arrive 30 minutes early:     [facility]  Dr. Mary Servin  [address] [facility], [address] [facility]  Main Phone: [phone]     Patient voiced understanding and states she has address location.

## 2024-01-28 DIAGNOSIS — I10 PRIMARY HYPERTENSION: ICD-10-CM

## 2024-01-29 RX ORDER — MONTELUKAST SODIUM 10 MG/1
10 TABLET ORAL DAILY
Qty: 90 TABLET | Refills: 1 | Status: SHIPPED | OUTPATIENT
Start: 2024-01-29

## 2024-01-29 RX ORDER — AMLODIPINE BESYLATE 5 MG/1
5 TABLET ORAL DAILY
Qty: 90 TABLET | Refills: 1 | Status: SHIPPED | OUTPATIENT
Start: 2024-01-29

## 2024-01-29 NOTE — TELEPHONE ENCOUNTER
Last Visit: 12/11/23   Next Appointment: None    Requested Prescriptions     Pending Prescriptions Disp Refills    montelukast (SINGULAIR) 10 MG tablet [Pharmacy Med Name: Montelukast Sodium 10 MG Oral Tablet] 90 tablet 3     Sig: TAKE 1 TABLET BY MOUTH DAILY    amLODIPine (NORVASC) 5 MG tablet [Pharmacy Med Name: amLODIPine Besylate 5 MG Oral Tablet] 90 tablet 3     Sig: TAKE 1 TABLET BY MOUTH DAILY

## 2024-05-29 ENCOUNTER — HOSPITAL ENCOUNTER (OUTPATIENT)
Facility: HOSPITAL | Age: 64
Setting detail: SPECIMEN
Discharge: HOME | End: 2024-06-01

## 2024-05-29 DIAGNOSIS — R79.89 ELEVATED TSH: ICD-10-CM

## 2024-05-29 DIAGNOSIS — E78.2 MIXED HYPERLIPIDEMIA: ICD-10-CM

## 2024-05-29 DIAGNOSIS — Z13.1 SCREENING FOR DIABETES MELLITUS: ICD-10-CM

## 2024-05-29 LAB
CHOLEST SERPL-MCNC: 196 MG/DL
EST. AVERAGE GLUCOSE BLD GHB EST-MCNC: 105 MG/DL
HBA1C MFR BLD: 5.3 % (ref 4.2–5.6)
HDLC SERPL-MCNC: 48 MG/DL (ref 40–60)
HDLC SERPL: 4.1 (ref 0–5)
LDLC SERPL CALC-MCNC: 126.4 MG/DL (ref 0–100)
LIPID PANEL: ABNORMAL
T4 FREE SERPL-MCNC: 1.1 NG/DL (ref 0.7–1.5)
TRIGL SERPL-MCNC: 108 MG/DL
TSH SERPL DL<=0.05 MIU/L-ACNC: 2.34 UIU/ML (ref 0.36–3.74)
VLDLC SERPL CALC-MCNC: 21.6 MG/DL

## 2024-05-29 PROCEDURE — 83036 HEMOGLOBIN GLYCOSYLATED A1C: CPT

## 2024-05-29 PROCEDURE — 84439 ASSAY OF FREE THYROXINE: CPT

## 2024-05-29 PROCEDURE — 84443 ASSAY THYROID STIM HORMONE: CPT

## 2024-05-29 PROCEDURE — 36415 COLL VENOUS BLD VENIPUNCTURE: CPT

## 2024-05-29 PROCEDURE — 80061 LIPID PANEL: CPT

## 2024-06-10 DIAGNOSIS — I10 PRIMARY HYPERTENSION: ICD-10-CM

## 2024-06-11 RX ORDER — AMLODIPINE BESYLATE 5 MG/1
5 TABLET ORAL DAILY
Qty: 90 TABLET | Refills: 3 | OUTPATIENT
Start: 2024-06-11

## 2024-08-28 RX ORDER — MONTELUKAST SODIUM 10 MG/1
10 TABLET ORAL DAILY
Qty: 90 TABLET | Refills: 3 | Status: SHIPPED | OUTPATIENT
Start: 2024-08-28

## 2024-12-26 DIAGNOSIS — F41.9 ANXIETY: ICD-10-CM

## 2024-12-27 RX ORDER — BUPROPION HYDROCHLORIDE 150 MG/1
150 TABLET, EXTENDED RELEASE ORAL DAILY
Qty: 90 TABLET | Refills: 1 | Status: SHIPPED | OUTPATIENT
Start: 2024-12-27

## 2024-12-27 NOTE — TELEPHONE ENCOUNTER
Last Visit: 06/05/24   Next Appointment: [facility]  Previous Refill Encounter: 06/05/24 #90 with 1 refills

## 2025-03-21 SDOH — ECONOMIC STABILITY: INCOME INSECURITY: IN THE LAST 12 MONTHS, WAS THERE A TIME WHEN YOU WERE NOT ABLE TO PAY THE MORTGAGE OR RENT ON TIME?: NO

## 2025-03-21 SDOH — ECONOMIC STABILITY: FOOD INSECURITY: WITHIN THE PAST 12 MONTHS, THE FOOD YOU BOUGHT JUST DIDN'T LAST AND YOU DIDN'T HAVE MONEY TO GET MORE.: NEVER TRUE

## 2025-03-21 SDOH — ECONOMIC STABILITY: FOOD INSECURITY: WITHIN THE PAST 12 MONTHS, YOU WORRIED THAT YOUR FOOD WOULD RUN OUT BEFORE YOU GOT MONEY TO BUY MORE.: NEVER TRUE

## 2025-03-21 SDOH — ECONOMIC STABILITY: TRANSPORTATION INSECURITY
IN THE PAST 12 MONTHS, HAS THE LACK OF TRANSPORTATION KEPT YOU FROM MEDICAL APPOINTMENTS OR FROM GETTING MEDICATIONS?: NO

## 2025-03-24 ENCOUNTER — OFFICE VISIT (OUTPATIENT)
Facility: CLINIC | Age: 65
End: 2025-03-24
Payer: COMMERCIAL

## 2025-03-24 VITALS
SYSTOLIC BLOOD PRESSURE: 112 MMHG | BODY MASS INDEX: 25.65 KG/M2 | RESPIRATION RATE: 16 BRPM | DIASTOLIC BLOOD PRESSURE: 75 MMHG | HEART RATE: 71 BPM | OXYGEN SATURATION: 96 % | HEIGHT: 67 IN | WEIGHT: 163.4 LBS

## 2025-03-24 DIAGNOSIS — Z13.0 SCREENING FOR DEFICIENCY ANEMIA: ICD-10-CM

## 2025-03-24 DIAGNOSIS — Z13.220 SCREENING FOR CHOLESTEROL LEVEL: ICD-10-CM

## 2025-03-24 DIAGNOSIS — Z71.9 ENCOUNTER FOR CONSULTATION: Primary | ICD-10-CM

## 2025-03-24 DIAGNOSIS — Z12.31 ENCOUNTER FOR SCREENING MAMMOGRAM FOR BREAST CANCER: ICD-10-CM

## 2025-03-24 DIAGNOSIS — Z12.31 ENCOUNTER FOR SCREENING MAMMOGRAM FOR MALIGNANT NEOPLASM OF BREAST: ICD-10-CM

## 2025-03-24 DIAGNOSIS — Z13.228 SCREENING FOR METABOLIC DISORDER: ICD-10-CM

## 2025-03-24 DIAGNOSIS — Z13.1 SCREENING FOR DIABETES MELLITUS: ICD-10-CM

## 2025-03-24 DIAGNOSIS — Z13.29 SCREENING FOR THYROID DISORDER: ICD-10-CM

## 2025-03-24 PROCEDURE — 99214 OFFICE O/P EST MOD 30 MIN: CPT | Performed by: NURSE PRACTITIONER

## 2025-03-24 PROCEDURE — 3078F DIAST BP <80 MM HG: CPT | Performed by: NURSE PRACTITIONER

## 2025-03-24 PROCEDURE — 3074F SYST BP LT 130 MM HG: CPT | Performed by: NURSE PRACTITIONER

## 2025-03-24 PROCEDURE — 1123F ACP DISCUSS/DSCN MKR DOCD: CPT | Performed by: NURSE PRACTITIONER

## 2025-03-24 ASSESSMENT — PATIENT HEALTH QUESTIONNAIRE - PHQ9
2. FEELING DOWN, DEPRESSED OR HOPELESS: NOT AT ALL
SUM OF ALL RESPONSES TO PHQ QUESTIONS 1-9: 0
1. LITTLE INTEREST OR PLEASURE IN DOING THINGS: NOT AT ALL

## 2025-03-24 NOTE — PROGRESS NOTES
04/19/2023    GFRAA >60 12/11/2020    AGRATIO 0.9 12/11/2020    GLOB 4.3 (H) 10/24/2023     Lab Results   Component Value Date/Time     04/19/2023 08:23 AM    K 4.3 04/19/2023 08:23 AM     04/19/2023 08:23 AM    CO2 28 04/19/2023 08:23 AM    BUN 24 04/19/2023 08:23 AM    CREATININE 0.82 04/19/2023 08:23 AM    GLUCOSE 96 04/19/2023 08:23 AM    CALCIUM 9.6 04/19/2023 08:23 AM    LABGLOM >60 04/19/2023 08:23 AM       Lab Results   Component Value Date    TSH 2.34 05/29/2024      Lab Results   Component Value Date    CHOL 196 05/29/2024    TRIG 108 05/29/2024    HDL 48 05/29/2024    .4 (H) 05/29/2024    VLDL 21.6 05/29/2024    CHOLHDLRATIO 4.1 05/29/2024      Hemoglobin A1C   Date Value Ref Range Status   05/29/2024 5.3 4.2 - 5.6 % Final     Comment:     (NOTE)  HbA1C Interpretive Ranges  <5.7              Normal  5.7 - 6.4         Consider Prediabetes  >6.5              Consider Diabetes        No results found for: \"ALBCREAT\"       PHYSICAL EXAM  Physical Exam      On this date 3/24/2025 I have spent 30 minutes reviewing previous notes, test results and face to face with the patient discussing the diagnosis and importance of compliance with the treatment plan as well as documenting on the day of the visit.     Disclaimer:    The patient understands our medical plan.  Alternatives have been explained and offered.  The risks, benefits and significant side effects of all medications have been reviewed. Anticipated time course and progression of condition reviewed. All questions have been addressed.  She is encouraged to employ the information provided in the after visit summary, which was reviewed.      Where applicable, she is instructed to call the clinic if she has not been notified either by phone or through [employer] with the results of her tests/labs or with an appointment plan for any referrals within 1 week(s). The patient is to call if her condition worsens or fails to improve or if significant

## 2025-05-28 DIAGNOSIS — F41.9 ANXIETY: ICD-10-CM

## 2025-05-29 NOTE — TELEPHONE ENCOUNTER
Last Visit:  3/27/2025  Next Appointment: 6/17/2025  Previous Refill Encounter:        Disp-90 tablet, R-1 Start: 12/27/2024

## 2025-05-30 RX ORDER — BUPROPION HYDROCHLORIDE 150 MG/1
150 TABLET, EXTENDED RELEASE ORAL DAILY
Qty: 90 TABLET | Refills: 1 | Status: SHIPPED | OUTPATIENT
Start: 2025-05-30

## 2025-07-21 RX ORDER — MONTELUKAST SODIUM 10 MG/1
10 TABLET ORAL DAILY
Qty: 90 TABLET | Refills: 3 | Status: SHIPPED | OUTPATIENT
Start: 2025-07-21